# Patient Record
Sex: MALE | Race: WHITE | NOT HISPANIC OR LATINO | URBAN - METROPOLITAN AREA
[De-identification: names, ages, dates, MRNs, and addresses within clinical notes are randomized per-mention and may not be internally consistent; named-entity substitution may affect disease eponyms.]

---

## 2023-07-06 ENCOUNTER — EMERGENCY (EMERGENCY)
Facility: HOSPITAL | Age: 63
LOS: 1 days | Discharge: HOME | End: 2023-07-06
Attending: EMERGENCY MEDICINE | Admitting: EMERGENCY MEDICINE
Payer: COMMERCIAL

## 2023-07-06 VITALS
OXYGEN SATURATION: 99 % | RESPIRATION RATE: 15 BRPM | HEART RATE: 81 BPM | HEIGHT: 70 IN | TEMPERATURE: 98 F | WEIGHT: 190.04 LBS | SYSTOLIC BLOOD PRESSURE: 152 MMHG | DIASTOLIC BLOOD PRESSURE: 83 MMHG

## 2023-07-06 PROCEDURE — 73630 X-RAY EXAM OF FOOT: CPT | Mod: 26,RT

## 2023-07-06 PROCEDURE — 99284 EMERGENCY DEPT VISIT MOD MDM: CPT | Mod: 25

## 2023-07-06 PROCEDURE — 73630 X-RAY EXAM OF FOOT: CPT

## 2023-07-06 PROCEDURE — 29515 APPLICATION SHORT LEG SPLINT: CPT

## 2023-07-06 PROCEDURE — 99283 EMERGENCY DEPT VISIT LOW MDM: CPT

## 2023-07-06 RX ORDER — IBUPROFEN 200 MG
600 TABLET ORAL ONCE
Refills: 0 | Status: DISCONTINUED | OUTPATIENT
Start: 2023-07-06 | End: 2023-07-06

## 2023-07-06 RX ORDER — OXYCODONE AND ACETAMINOPHEN 5; 325 MG/1; MG/1
1 TABLET ORAL
Qty: 6 | Refills: 0
Start: 2023-07-06

## 2023-07-06 RX ORDER — ACETAMINOPHEN 500 MG
975 TABLET ORAL ONCE
Refills: 0 | Status: DISCONTINUED | OUTPATIENT
Start: 2023-07-06 | End: 2023-07-06

## 2023-07-06 NOTE — ED PROVIDER NOTE - PROGRESS NOTE DETAILS
Patient stable.  X-ray results discussed with patient, no evidence of acute fracture.  Discussed etiology of pain uncertain, no evidence of life-threatening condition at this time.  Patient not over joint, low suspicion for gouty arthritis.  Discussed may be related to early diabetic neuropathy versus tendinitis.  Recommend anti-inflammatory, ice, rest, Ace wrap, elevation.  Will refer to podiatry

## 2023-07-06 NOTE — ED PROVIDER NOTE - DIFFERENTIAL DIAGNOSIS
Differential Diagnosis Differentials include but not limited to sprain, strain, contusion, tendinitis, fracture, gouty arthritis

## 2023-07-06 NOTE — ED ADULT NURSE NOTE - OBJECTIVE STATEMENT
Pt is alert and oriented. Pt states that he was at work and suddenly developed severe right foot pain. Pt denies any injury to the foot. Pts right foot appears slightly red and slightly swollen. + pedal pulses in bilateral feet. Pt denies sob, chest pain, nausea, vomiting, and dizziness. Pt resp are even and unlabored, skin color eliecer for race. Pt updated on plan of care.

## 2023-07-06 NOTE — ED PROVIDER NOTE - CARE PROVIDER_API CALL
Jarek Gregory  Podiatric Medicine and Surgery  [address] [place] [address]  Phone: [phone]  Fax: [phone]  Follow Up Time: 1-3 Days

## 2023-07-06 NOTE — ED PROVIDER NOTE - PATIENT PORTAL LINK FT
You can access the FollowMyHealth Patient Portal offered by [facility] by registering at the following website: [email or web address]. By joining [facility]’s FollowMyHealth portal, you will also be able to view your health information using other applications (apps) compatible with our system.

## 2023-07-06 NOTE — ED PROVIDER NOTE - MUSCULOSKELETAL, MLM
mild soft tissue tenderness and slight swelling to dorsal medical aspect of right foot. no tenderness to 1st MTP joint or base of 5th metatarsal. full ROM

## 2023-07-06 NOTE — ED PROVIDER NOTE - NS ED ATTENDING STATEMENT MOD
This was a shared visit with the DARLING. I reviewed and verified the documentation and independently performed the documented:

## 2023-07-06 NOTE — ED ADULT NURSE NOTE - NSFALLUNIVINTERV_ED_ALL_ED
Bed/Stretcher in lowest position, wheels locked, appropriate side rails in place/Call bell, personal items and telephone in reach/Instruct patient to call for assistance before getting out of bed/chair/stretcher/Non-slip footwear applied when patient is off stretcher/[place] to call system/Physically safe environment - no spills, clutter or unnecessary equipment/Purposeful proactive rounding/Room/bathroom lighting operational, light cord in reach

## 2023-07-06 NOTE — ED PROCEDURE NOTE - NS ED ATTENDING STATEMENT MOD
[Breast Self Exam] : breast self exam [Nutrition] : nutrition [Exercise] : exercise This was a shared visit with the DARLING. I reviewed and verified the documentation and independently performed the documented:

## 2023-07-06 NOTE — ED PROVIDER NOTE - OBJECTIVE STATEMENT
62-year-old male with history of diabetes mellitus presents with pain to the top of his right foot that occurred today at work around 2 PM.  Denies any injury or trauma.  States he was sitting at the desk when the pain started.  Noticed some slight redness so took 2 aspirin and Benadryl.  Redness is overall improved.  Was not itchy in nature.  Denies any increase of activity.  Denies any change in footwear.  Denies any known bites or exposures.  Denies history of similar symptoms.  Denies any fevers.  Denies calf pain or swelling. reports right foot looks swollen compared to left   PCP Juan Pablo Gomez

## 2023-07-06 NOTE — ED PROVIDER NOTE - NSFOLLOWUPINSTRUCTIONS_ED_ALL_ED_FT
Rest, ice, compression, elevation, support  Naprosyn twice a day with food for pain and inflammation  Percocet next few days for severe pain  Follow-up with podiatry, referral provided      Foot Pain  Many things can cause foot pain. Some common causes are:  An injury.  A sprain.  Arthritis.  Blisters.  Bunions.  Follow these instructions at home:  Managing pain, stiffness, and swelling    Bag of ice on a towel on the skin.  If directed, put ice on the painful area:  Put ice in a plastic bag.  Place a towel between your skin and the bag.  Leave the ice on for 20 minutes, 2–3 times a day.  Activity    Do not stand or walk for long periods.  Return to your normal activities as told by your health care provider. Ask your health care provider what activities are safe for you.  Do stretches to relieve foot pain and stiffness as told by your health care provider.  Do not lift anything that is heavier than 10 lb (4.5 kg), or the limit that you are told, until your health care provider says that it is safe. Lifting a lot of weight can put added pressure on your feet.  Lifestyle    Wear comfortable, supportive shoes that fit you well. Do not wear high heels.  Keep your feet clean and dry.  General instructions    Take over-the-counter and prescription medicines only as told by your health care provider.  Rub your foot gently.  Pay attention to any changes in your symptoms.  Keep all follow-up visits as told by your health care provider. This is important.  Contact a health care provider if:  Your pain does not get better after a few days of self-care.  Your pain gets worse.  You cannot stand on your foot.  Get help right away if:  Your foot is numb or tingling.  Your foot or toes are swollen.  Your foot or toes turn white or blue.  You have warmth and redness along your foot.  Summary  Common causes of foot pain are injury, sprain, arthritis, blisters, or bunions.  Ice, medicines, and comfortable shoes may help foot pain.  Contact your health care provider if your pain does not get better after a few days of self-care.  This information is not intended to replace advice given to you by your health care provider. Make sure you discuss any questions you have with your health care provider.

## 2023-07-06 NOTE — ED ADULT TRIAGE NOTE - CHIEF COMPLAINT QUOTE
I have right foot pain since 2 pm today. patient was working in his office and no trauma to his foot. hx of DM  good pulse but swollen

## 2023-07-06 NOTE — ED PROVIDER NOTE - CLINICAL SUMMARY MEDICAL DECISION MAKING FREE TEXT BOX
62-year-old male with history of diabetes complaining of right foot pain which occurred this afternoon while he was sitting at a desk.  Denies injury or precipitating event.  Took aspirin and Benadryl thinking it might be a bug bite of some kind.  Denies fever redness swelling or other symptom.  Physical exam vital signs stable afebrile no distress.  Extremity exam right foot without visible bruise or deformity, no swelling.  No redness or warmth.  Point tenderness over fourth metatarsal.  Full range of motion pulses and sensation intact.  Remainder of exam is unremarkable.  Impression is right foot pain rule out fracture, plan is x-ray pain meds may need splint or wrap and Ortho follow-up.

## 2024-02-24 ENCOUNTER — TRANSCRIPTION ENCOUNTER (OUTPATIENT)
Age: 64
End: 2024-02-24

## 2024-02-25 ENCOUNTER — INPATIENT (INPATIENT)
Facility: HOSPITAL | Age: 64
LOS: 2 days | Discharge: HOME | DRG: 347 | End: 2024-02-28
Attending: SURGERY | Admitting: SURGERY
Payer: COMMERCIAL

## 2024-02-25 ENCOUNTER — RESULT REVIEW (OUTPATIENT)
Age: 64
End: 2024-02-25

## 2024-02-25 VITALS
HEART RATE: 77 BPM | OXYGEN SATURATION: 97 % | TEMPERATURE: 98 F | RESPIRATION RATE: 16 BRPM | SYSTOLIC BLOOD PRESSURE: 126 MMHG | DIASTOLIC BLOOD PRESSURE: 77 MMHG | HEIGHT: 69 IN | WEIGHT: 198.64 LBS

## 2024-02-25 DIAGNOSIS — R19.8 OTHER SPECIFIED SYMPTOMS AND SIGNS INVOLVING THE DIGESTIVE SYSTEM AND ABDOMEN: ICD-10-CM

## 2024-02-25 DIAGNOSIS — Z90.49 ACQUIRED ABSENCE OF OTHER SPECIFIED PARTS OF DIGESTIVE TRACT: Chronic | ICD-10-CM

## 2024-02-25 DIAGNOSIS — E11.65 TYPE 2 DIABETES MELLITUS WITH HYPERGLYCEMIA: ICD-10-CM

## 2024-02-25 PROBLEM — E11.9 TYPE 2 DIABETES MELLITUS WITHOUT COMPLICATIONS: Chronic | Status: ACTIVE | Noted: 2023-07-06

## 2024-02-25 LAB
A1C WITH ESTIMATED AVERAGE GLUCOSE RESULT: 7.2 % — HIGH (ref 4–5.6)
ALBUMIN SERPL ELPH-MCNC: 3.6 G/DL — SIGNIFICANT CHANGE UP (ref 3.3–5)
ALP SERPL-CCNC: 57 U/L — SIGNIFICANT CHANGE UP (ref 30–120)
ALT FLD-CCNC: 32 U/L — SIGNIFICANT CHANGE UP (ref 10–60)
ANION GAP SERPL CALC-SCNC: 6 MMOL/L — SIGNIFICANT CHANGE UP (ref 5–17)
APPEARANCE UR: CLEAR — SIGNIFICANT CHANGE UP
AST SERPL-CCNC: 21 U/L — SIGNIFICANT CHANGE UP (ref 10–40)
BASOPHILS # BLD AUTO: 0.04 K/UL — SIGNIFICANT CHANGE UP (ref 0–0.2)
BASOPHILS NFR BLD AUTO: 0.3 % — SIGNIFICANT CHANGE UP (ref 0–2)
BILIRUB SERPL-MCNC: 0.4 MG/DL — SIGNIFICANT CHANGE UP (ref 0.2–1.2)
BILIRUB UR-MCNC: NEGATIVE — SIGNIFICANT CHANGE UP
BLD GP AB SCN SERPL QL: SIGNIFICANT CHANGE UP
BUN SERPL-MCNC: 17 MG/DL — SIGNIFICANT CHANGE UP (ref 7–23)
CALCIUM SERPL-MCNC: 9.1 MG/DL — SIGNIFICANT CHANGE UP (ref 8.4–10.5)
CHLORIDE SERPL-SCNC: 104 MMOL/L — SIGNIFICANT CHANGE UP (ref 96–108)
CO2 SERPL-SCNC: 28 MMOL/L — SIGNIFICANT CHANGE UP (ref 22–31)
COLOR SPEC: YELLOW — SIGNIFICANT CHANGE UP
CREAT SERPL-MCNC: 1.04 MG/DL — SIGNIFICANT CHANGE UP (ref 0.5–1.3)
DIFF PNL FLD: NEGATIVE — SIGNIFICANT CHANGE UP
EGFR: 81 ML/MIN/1.73M2 — SIGNIFICANT CHANGE UP
EOSINOPHIL # BLD AUTO: 0.28 K/UL — SIGNIFICANT CHANGE UP (ref 0–0.5)
EOSINOPHIL NFR BLD AUTO: 2.2 % — SIGNIFICANT CHANGE UP (ref 0–6)
ESTIMATED AVERAGE GLUCOSE: 160 MG/DL — HIGH (ref 68–114)
GLUCOSE BLDC GLUCOMTR-MCNC: 116 MG/DL — HIGH (ref 70–99)
GLUCOSE BLDC GLUCOMTR-MCNC: 165 MG/DL — HIGH (ref 70–99)
GLUCOSE BLDC GLUCOMTR-MCNC: 222 MG/DL — HIGH (ref 70–99)
GLUCOSE BLDC GLUCOMTR-MCNC: 235 MG/DL — HIGH (ref 70–99)
GLUCOSE BLDC GLUCOMTR-MCNC: 277 MG/DL — HIGH (ref 70–99)
GLUCOSE SERPL-MCNC: 206 MG/DL — HIGH (ref 70–99)
GLUCOSE UR QL: 100 MG/DL
HCT VFR BLD CALC: 43.2 % — SIGNIFICANT CHANGE UP (ref 39–50)
HGB BLD-MCNC: 13.8 G/DL — SIGNIFICANT CHANGE UP (ref 13–17)
IMM GRANULOCYTES NFR BLD AUTO: 0.4 % — SIGNIFICANT CHANGE UP (ref 0–0.9)
KETONES UR-MCNC: NEGATIVE MG/DL — SIGNIFICANT CHANGE UP
LACTATE SERPL-SCNC: 2 MMOL/L — SIGNIFICANT CHANGE UP (ref 0.7–2)
LEUKOCYTE ESTERASE UR-ACNC: NEGATIVE — SIGNIFICANT CHANGE UP
LIDOCAIN IGE QN: 49 U/L — SIGNIFICANT CHANGE UP (ref 16–77)
LYMPHOCYTES # BLD AUTO: 1.38 K/UL — SIGNIFICANT CHANGE UP (ref 1–3.3)
LYMPHOCYTES # BLD AUTO: 11 % — LOW (ref 13–44)
MCHC RBC-ENTMCNC: 27.6 PG — SIGNIFICANT CHANGE UP (ref 27–34)
MCHC RBC-ENTMCNC: 31.9 GM/DL — LOW (ref 32–36)
MCV RBC AUTO: 86.4 FL — SIGNIFICANT CHANGE UP (ref 80–100)
MONOCYTES # BLD AUTO: 0.85 K/UL — SIGNIFICANT CHANGE UP (ref 0–0.9)
MONOCYTES NFR BLD AUTO: 6.8 % — SIGNIFICANT CHANGE UP (ref 2–14)
NEUTROPHILS # BLD AUTO: 9.95 K/UL — HIGH (ref 1.8–7.4)
NEUTROPHILS NFR BLD AUTO: 79.3 % — HIGH (ref 43–77)
NITRITE UR-MCNC: NEGATIVE — SIGNIFICANT CHANGE UP
NRBC # BLD: 0 /100 WBCS — SIGNIFICANT CHANGE UP (ref 0–0)
PH UR: 5.5 — SIGNIFICANT CHANGE UP (ref 5–8)
PLATELET # BLD AUTO: 244 K/UL — SIGNIFICANT CHANGE UP (ref 150–400)
POTASSIUM SERPL-MCNC: 5.1 MMOL/L — SIGNIFICANT CHANGE UP (ref 3.5–5.3)
POTASSIUM SERPL-SCNC: 5.1 MMOL/L — SIGNIFICANT CHANGE UP (ref 3.5–5.3)
PROT SERPL-MCNC: 7.2 G/DL — SIGNIFICANT CHANGE UP (ref 6–8.3)
PROT UR-MCNC: NEGATIVE MG/DL — SIGNIFICANT CHANGE UP
RBC # BLD: 5 M/UL — SIGNIFICANT CHANGE UP (ref 4.2–5.8)
RBC # FLD: 13.2 % — SIGNIFICANT CHANGE UP (ref 10.3–14.5)
SODIUM SERPL-SCNC: 138 MMOL/L — SIGNIFICANT CHANGE UP (ref 135–145)
SP GR SPEC: 1.07 — HIGH (ref 1–1.03)
UROBILINOGEN FLD QL: 0.2 MG/DL — SIGNIFICANT CHANGE UP (ref 0.2–1)
WBC # BLD: 12.55 K/UL — HIGH (ref 3.8–10.5)
WBC # FLD AUTO: 12.55 K/UL — HIGH (ref 3.8–10.5)

## 2024-02-25 PROCEDURE — 71045 X-RAY EXAM CHEST 1 VIEW: CPT | Mod: 26

## 2024-02-25 PROCEDURE — 99223 1ST HOSP IP/OBS HIGH 75: CPT | Mod: 57

## 2024-02-25 PROCEDURE — 88307 TISSUE EXAM BY PATHOLOGIST: CPT | Mod: 26

## 2024-02-25 PROCEDURE — 44202 LAP ENTERECTOMY: CPT

## 2024-02-25 PROCEDURE — 74177 CT ABD & PELVIS W/CONTRAST: CPT | Mod: 26,MC

## 2024-02-25 PROCEDURE — 99223 1ST HOSP IP/OBS HIGH 75: CPT

## 2024-02-25 PROCEDURE — 99285 EMERGENCY DEPT VISIT HI MDM: CPT

## 2024-02-25 DEVICE — STAPLER COVIDIEN TRI-STAPLE 45MM BLACK INTELLIGENT RELOAD: Type: IMPLANTABLE DEVICE | Site: ABDOMINAL | Status: FUNCTIONAL

## 2024-02-25 DEVICE — STAPLER COVIDIEN TRI-STAPLE 60MM PURPLE RELOAD: Type: IMPLANTABLE DEVICE | Site: ABDOMINAL | Status: FUNCTIONAL

## 2024-02-25 DEVICE — STAPLER COVIDIEN TRI-STAPLE 45MM TAN RELOAD: Type: IMPLANTABLE DEVICE | Site: ABDOMINAL | Status: FUNCTIONAL

## 2024-02-25 DEVICE — STAPLER COVIDIEN TRI-STAPLE 45MM PURPLE RELOAD: Type: IMPLANTABLE DEVICE | Site: ABDOMINAL | Status: FUNCTIONAL

## 2024-02-25 RX ORDER — DEXTROSE 50 % IN WATER 50 %
12.5 SYRINGE (ML) INTRAVENOUS ONCE
Refills: 0 | Status: DISCONTINUED | OUTPATIENT
Start: 2024-02-25 | End: 2024-02-28

## 2024-02-25 RX ORDER — SODIUM CHLORIDE 9 MG/ML
1000 INJECTION, SOLUTION INTRAVENOUS
Refills: 0 | Status: DISCONTINUED | OUTPATIENT
Start: 2024-02-25 | End: 2024-02-25

## 2024-02-25 RX ORDER — SIMVASTATIN 20 MG/1
1 TABLET, FILM COATED ORAL
Refills: 0 | DISCHARGE

## 2024-02-25 RX ORDER — GLUCAGON INJECTION, SOLUTION 0.5 MG/.1ML
1 INJECTION, SOLUTION SUBCUTANEOUS ONCE
Refills: 0 | Status: DISCONTINUED | OUTPATIENT
Start: 2024-02-25 | End: 2024-02-28

## 2024-02-25 RX ORDER — PIPERACILLIN AND TAZOBACTAM 4; .5 G/20ML; G/20ML
3.38 INJECTION, POWDER, LYOPHILIZED, FOR SOLUTION INTRAVENOUS ONCE
Refills: 0 | Status: DISCONTINUED | OUTPATIENT
Start: 2024-02-25 | End: 2024-02-25

## 2024-02-25 RX ORDER — MORPHINE SULFATE 50 MG/1
4 CAPSULE, EXTENDED RELEASE ORAL ONCE
Refills: 0 | Status: DISCONTINUED | OUTPATIENT
Start: 2024-02-25 | End: 2024-02-25

## 2024-02-25 RX ORDER — LISINOPRIL 2.5 MG/1
10 TABLET ORAL DAILY
Refills: 0 | Status: DISCONTINUED | OUTPATIENT
Start: 2024-02-25 | End: 2024-02-28

## 2024-02-25 RX ORDER — METFORMIN HYDROCHLORIDE 850 MG/1
1 TABLET ORAL
Refills: 0 | DISCHARGE

## 2024-02-25 RX ORDER — HYDROMORPHONE HYDROCHLORIDE 2 MG/ML
1 INJECTION INTRAMUSCULAR; INTRAVENOUS; SUBCUTANEOUS
Refills: 0 | Status: DISCONTINUED | OUTPATIENT
Start: 2024-02-25 | End: 2024-02-25

## 2024-02-25 RX ORDER — OMEPRAZOLE 10 MG/1
1 CAPSULE, DELAYED RELEASE ORAL
Refills: 0 | DISCHARGE

## 2024-02-25 RX ORDER — HYDROMORPHONE HYDROCHLORIDE 2 MG/ML
0.5 INJECTION INTRAMUSCULAR; INTRAVENOUS; SUBCUTANEOUS EVERY 4 HOURS
Refills: 0 | Status: DISCONTINUED | OUTPATIENT
Start: 2024-02-25 | End: 2024-02-28

## 2024-02-25 RX ORDER — LISINOPRIL 2.5 MG/1
1 TABLET ORAL
Refills: 0 | DISCHARGE

## 2024-02-25 RX ORDER — SODIUM CHLORIDE 9 MG/ML
1000 INJECTION INTRAMUSCULAR; INTRAVENOUS; SUBCUTANEOUS ONCE
Refills: 0 | Status: COMPLETED | OUTPATIENT
Start: 2024-02-25 | End: 2024-02-25

## 2024-02-25 RX ORDER — DEXTROSE 50 % IN WATER 50 %
25 SYRINGE (ML) INTRAVENOUS ONCE
Refills: 0 | Status: DISCONTINUED | OUTPATIENT
Start: 2024-02-25 | End: 2024-02-28

## 2024-02-25 RX ORDER — CEFOTETAN DISODIUM 1 G
2 VIAL (EA) INJECTION ONCE
Refills: 0 | Status: DISCONTINUED | OUTPATIENT
Start: 2024-02-25 | End: 2024-02-28

## 2024-02-25 RX ORDER — DEXTROSE 50 % IN WATER 50 %
15 SYRINGE (ML) INTRAVENOUS ONCE
Refills: 0 | Status: DISCONTINUED | OUTPATIENT
Start: 2024-02-25 | End: 2024-02-28

## 2024-02-25 RX ORDER — DULAGLUTIDE 4.5 MG/.5ML
1.5 INJECTION, SOLUTION SUBCUTANEOUS
Refills: 0 | DISCHARGE

## 2024-02-25 RX ORDER — ONDANSETRON 8 MG/1
4 TABLET, FILM COATED ORAL ONCE
Refills: 0 | Status: DISCONTINUED | OUTPATIENT
Start: 2024-02-25 | End: 2024-02-25

## 2024-02-25 RX ORDER — ONDANSETRON 8 MG/1
4 TABLET, FILM COATED ORAL ONCE
Refills: 0 | Status: DISCONTINUED | OUTPATIENT
Start: 2024-02-25 | End: 2024-02-28

## 2024-02-25 RX ORDER — SODIUM CHLORIDE 9 MG/ML
1000 INJECTION, SOLUTION INTRAVENOUS
Refills: 0 | Status: DISCONTINUED | OUTPATIENT
Start: 2024-02-25 | End: 2024-02-28

## 2024-02-25 RX ORDER — HYDROMORPHONE HYDROCHLORIDE 2 MG/ML
0.5 INJECTION INTRAMUSCULAR; INTRAVENOUS; SUBCUTANEOUS
Refills: 0 | Status: DISCONTINUED | OUTPATIENT
Start: 2024-02-25 | End: 2024-02-25

## 2024-02-25 RX ORDER — ACETAMINOPHEN 500 MG
1000 TABLET ORAL EVERY 6 HOURS
Refills: 0 | Status: COMPLETED | OUTPATIENT
Start: 2024-02-25 | End: 2024-02-26

## 2024-02-25 RX ORDER — PIPERACILLIN AND TAZOBACTAM 4; .5 G/20ML; G/20ML
3.38 INJECTION, POWDER, LYOPHILIZED, FOR SOLUTION INTRAVENOUS ONCE
Refills: 0 | Status: COMPLETED | OUTPATIENT
Start: 2024-02-25 | End: 2024-02-25

## 2024-02-25 RX ORDER — ATORVASTATIN CALCIUM 80 MG/1
40 TABLET, FILM COATED ORAL AT BEDTIME
Refills: 0 | Status: DISCONTINUED | OUTPATIENT
Start: 2024-02-25 | End: 2024-02-28

## 2024-02-25 RX ORDER — INSULIN LISPRO 100/ML
0 VIAL (ML) SUBCUTANEOUS
Refills: 0 | DISCHARGE

## 2024-02-25 RX ORDER — INSULIN LISPRO 100/ML
VIAL (ML) SUBCUTANEOUS EVERY 6 HOURS
Refills: 0 | Status: DISCONTINUED | OUTPATIENT
Start: 2024-02-25 | End: 2024-02-25

## 2024-02-25 RX ORDER — PIPERACILLIN AND TAZOBACTAM 4; .5 G/20ML; G/20ML
3.38 INJECTION, POWDER, LYOPHILIZED, FOR SOLUTION INTRAVENOUS EVERY 8 HOURS
Refills: 0 | Status: DISCONTINUED | OUTPATIENT
Start: 2024-02-25 | End: 2024-02-28

## 2024-02-25 RX ORDER — OXYCODONE HYDROCHLORIDE 5 MG/1
5 TABLET ORAL EVERY 6 HOURS
Refills: 0 | Status: DISCONTINUED | OUTPATIENT
Start: 2024-02-25 | End: 2024-02-28

## 2024-02-25 RX ORDER — ONDANSETRON 8 MG/1
4 TABLET, FILM COATED ORAL ONCE
Refills: 0 | Status: COMPLETED | OUTPATIENT
Start: 2024-02-25 | End: 2024-02-25

## 2024-02-25 RX ORDER — INSULIN LISPRO 100/ML
VIAL (ML) SUBCUTANEOUS EVERY 6 HOURS
Refills: 0 | Status: DISCONTINUED | OUTPATIENT
Start: 2024-02-25 | End: 2024-02-26

## 2024-02-25 RX ORDER — PANTOPRAZOLE SODIUM 20 MG/1
40 TABLET, DELAYED RELEASE ORAL DAILY
Refills: 0 | Status: DISCONTINUED | OUTPATIENT
Start: 2024-02-25 | End: 2024-02-28

## 2024-02-25 RX ADMIN — PIPERACILLIN AND TAZOBACTAM 25 GRAM(S): 4; .5 INJECTION, POWDER, LYOPHILIZED, FOR SOLUTION INTRAVENOUS at 17:05

## 2024-02-25 RX ADMIN — SODIUM CHLORIDE 1000 MILLILITER(S): 9 INJECTION INTRAMUSCULAR; INTRAVENOUS; SUBCUTANEOUS at 09:21

## 2024-02-25 RX ADMIN — Medication 1000 MILLIGRAM(S): at 21:20

## 2024-02-25 RX ADMIN — PIPERACILLIN AND TAZOBACTAM 200 GRAM(S): 4; .5 INJECTION, POWDER, LYOPHILIZED, FOR SOLUTION INTRAVENOUS at 09:21

## 2024-02-25 RX ADMIN — HYDROMORPHONE HYDROCHLORIDE 0.5 MILLIGRAM(S): 2 INJECTION INTRAMUSCULAR; INTRAVENOUS; SUBCUTANEOUS at 18:30

## 2024-02-25 RX ADMIN — HYDROMORPHONE HYDROCHLORIDE 0.5 MILLIGRAM(S): 2 INJECTION INTRAMUSCULAR; INTRAVENOUS; SUBCUTANEOUS at 18:15

## 2024-02-25 RX ADMIN — SODIUM CHLORIDE 100 MILLILITER(S): 9 INJECTION, SOLUTION INTRAVENOUS at 13:44

## 2024-02-25 RX ADMIN — ATORVASTATIN CALCIUM 40 MILLIGRAM(S): 80 TABLET, FILM COATED ORAL at 22:04

## 2024-02-25 RX ADMIN — SODIUM CHLORIDE 100 MILLILITER(S): 9 INJECTION, SOLUTION INTRAVENOUS at 17:13

## 2024-02-25 RX ADMIN — MORPHINE SULFATE 4 MILLIGRAM(S): 50 CAPSULE, EXTENDED RELEASE ORAL at 06:44

## 2024-02-25 RX ADMIN — SODIUM CHLORIDE 1000 MILLILITER(S): 9 INJECTION INTRAMUSCULAR; INTRAVENOUS; SUBCUTANEOUS at 11:30

## 2024-02-25 RX ADMIN — HYDROMORPHONE HYDROCHLORIDE 0.5 MILLIGRAM(S): 2 INJECTION INTRAMUSCULAR; INTRAVENOUS; SUBCUTANEOUS at 23:52

## 2024-02-25 RX ADMIN — SODIUM CHLORIDE 1000 MILLILITER(S): 9 INJECTION INTRAMUSCULAR; INTRAVENOUS; SUBCUTANEOUS at 07:44

## 2024-02-25 RX ADMIN — MORPHINE SULFATE 4 MILLIGRAM(S): 50 CAPSULE, EXTENDED RELEASE ORAL at 06:41

## 2024-02-25 RX ADMIN — SODIUM CHLORIDE 150 MILLILITER(S): 9 INJECTION, SOLUTION INTRAVENOUS at 21:17

## 2024-02-25 RX ADMIN — Medication 4: at 18:39

## 2024-02-25 RX ADMIN — MORPHINE SULFATE 4 MILLIGRAM(S): 50 CAPSULE, EXTENDED RELEASE ORAL at 07:21

## 2024-02-25 RX ADMIN — ONDANSETRON 4 MILLIGRAM(S): 8 TABLET, FILM COATED ORAL at 06:41

## 2024-02-25 RX ADMIN — MORPHINE SULFATE 4 MILLIGRAM(S): 50 CAPSULE, EXTENDED RELEASE ORAL at 08:00

## 2024-02-25 RX ADMIN — Medication 400 MILLIGRAM(S): at 21:17

## 2024-02-25 RX ADMIN — PIPERACILLIN AND TAZOBACTAM 3.38 GRAM(S): 4; .5 INJECTION, POWDER, LYOPHILIZED, FOR SOLUTION INTRAVENOUS at 10:00

## 2024-02-25 RX ADMIN — SODIUM CHLORIDE 1000 MILLILITER(S): 9 INJECTION INTRAMUSCULAR; INTRAVENOUS; SUBCUTANEOUS at 06:45

## 2024-02-25 NOTE — ED ADULT NURSE NOTE - FINAL NURSING ELECTRONIC SIGNATURE
Patient understands there is an increased risk of corneal edema after cataract surgery. 25-Feb-2024 11:39

## 2024-02-25 NOTE — ED ADULT NURSE NOTE - OBJECTIVE STATEMENT
patient presents to emergency department with approximately 2-1/2 hours of right lower quadrant abdominal pain.  Patient states the pain is constant.  States that at the onset it was radiating up his abdomen to his shoulder.  But now is localized to the right lower quadrant.  Has had nausea but no vomiting.  No diarrhea or constipation.  No urinary complaints.  Patient has a history of colon cancer with a resection of the "lower part of his colon".

## 2024-02-25 NOTE — H&P ADULT - HISTORY OF PRESENT ILLNESS
63y M presenting with acute onset right sided abdominal pain with nausea after eating pizza last night.  Symptoms began approximately 4am.  Pt thought he could be having appendicitis.  His however s/p Right hemicolectomy for colon Ca (performed by Dr Dayo Soto) in August 2023 and hence has no appendix.

## 2024-02-25 NOTE — BRIEF OPERATIVE NOTE - FIRST ASSIST
PA/NP...
Quality 226: Preventive Care And Screening: Tobacco Use: Screening And Cessation Intervention: Patient screened for tobacco use and is an ex/non-smoker
Quality 110: Preventive Care And Screening: Influenza Immunization: Influenza immunization was not ordered or administered, reason not given
Quality 431: Preventive Care And Screening: Unhealthy Alcohol Use - Screening: Patient screened for unhealthy alcohol use using a single question and scores less than 2 times per year
Detail Level: Detailed

## 2024-02-25 NOTE — H&P ADULT - ASSESSMENT
63y M, HTN, DM presenting with acute onset of Right sided abdominal pain.    Hx of Right hemicolectomy for Ca in August 2023 by Dr Soto  Leukocytosis 12  Abnormal CT scan, Images personally reviewed and discussed with patient and his wife.  Findings suggestive of free intraperitoneal air around liver.  Fecalization of SB loops right side of abdomen.  Possible SBO however no clear transition point.    Given radiographic findings and clinical exam, will take patient to OR for diagnostic laparoscopy, possible exploratory laparotomy, possible bowel resection, possible colostomy.  Risk, Benefits, and Alternatives to surgery have been discussed.  This includes but is not limited to bleeding, infection, damage to adjacent structures, need for additional surgery or interventions, adverse effects of anesthesia such as cardio-respiratory complications, prolonged intubation, cardiac arrhythmia, arrest, and or death.  Risks of forgoing surgery have also been discussed including progression of, and/or worsening of current condition which may then require urgent or emergent treatment or surgery.    Case discussed with Dr Soto's covering surgeon Dr Harpreet Handley.  They are not currently on staff here at [facility].  Medical team consulted as well for assistance in management of comorbidities, HTN and DM.  Disposition pending operative findings.

## 2024-02-25 NOTE — CONSULT NOTE ADULT - SUBJECTIVE AND OBJECTIVE BOX
Patient is a 63y old  Male who presents with a chief complaint of Abdominal pain.  Free intraperitoneal air (25 Feb 2024 10:52)      Reason For Consult: dm2 uncontrolled    HPI:  63y M presenting with acute onset right sided abdominal pain with nausea after eating pizza last night.  Symptoms began approximately 4am.  Pt thought he could be having appendicitis.  His however s/p Right hemicolectomy for colon Ca (performed by Dr Dayo Soto) in August 2023 and hence has no appendix.   (25 Feb 2024 10:52)      PAST MEDICAL & SURGICAL HISTORY:  Diabetes      Colon cancer      Hypertension      History of colon resection          FAMILY HISTORY:        Social History:    MEDICATIONS  (STANDING):  acetaminophen   IVPB .. 1000 milliGRAM(s) IV Intermittent every 6 hours  atorvastatin 40 milliGRAM(s) Oral at bedtime  cefoTEtan  IVPB 2 Gram(s) IV Intermittent once  dextrose 5%. 1000 milliLiter(s) (100 mL/Hr) IV Continuous <Continuous>  dextrose 5%. 1000 milliLiter(s) (50 mL/Hr) IV Continuous <Continuous>  dextrose 50% Injectable 25 Gram(s) IV Push once  dextrose 50% Injectable 25 Gram(s) IV Push once  dextrose 50% Injectable 12.5 Gram(s) IV Push once  glucagon  Injectable 1 milliGRAM(s) IntraMuscular once  insulin lispro (ADMELOG) corrective regimen sliding scale   SubCutaneous every 6 hours  lactated ringers. 1000 milliLiter(s) (100 mL/Hr) IV Continuous <Continuous>  lactated ringers. 1000 milliLiter(s) (150 mL/Hr) IV Continuous <Continuous>  lisinopril 10 milliGRAM(s) Oral daily  ondansetron Injectable 4 milliGRAM(s) IV Push once  pantoprazole  Injectable 40 milliGRAM(s) IV Push daily  piperacillin/tazobactam IVPB.. 3.375 Gram(s) IV Intermittent every 8 hours    MEDICATIONS  (PRN):  dextrose Oral Gel 15 Gram(s) Oral once PRN Blood Glucose LESS THAN 70 milliGRAM(s)/deciliter  HYDROmorphone  Injectable 0.5 milliGRAM(s) IV Push every 10 minutes PRN Moderate Pain (4 - 6)  HYDROmorphone  Injectable 1 milliGRAM(s) IV Push every 10 minutes PRN Severe Pain (7 - 10)  HYDROmorphone  Injectable 0.5 milliGRAM(s) IV Push every 4 hours PRN Severe Pain (7 - 10)  ondansetron Injectable 4 milliGRAM(s) IV Push once PRN Nausea and/or Vomiting  oxyCODONE    IR 5 milliGRAM(s) Oral every 6 hours PRN Moderate Pain (4 - 6)        T(C): 37.2 (02-25-24 @ 14:40), Max: 37.3 (02-25-24 @ 11:24)  HR: 82 (02-25-24 @ 14:55) (77 - 100)  BP: 130/68 (02-25-24 @ 14:55) (113/65 - 154/80)  RR: 14 (02-25-24 @ 14:55) (13 - 18)  SpO2: 94% (02-25-24 @ 14:55) (94% - 99%)  Wt(kg): --    PHYSICAL EXAM:  CHEST/LUNG: Clear to percussion bilaterally; No rales, rhonchi, wheezing, or rubs  HEART: Regular rate and rhythm; No murmurs, rubs, or gallops  ABDOMEN: Soft, Nontender, Nondistended; Bowel sounds present  EXTREMITIES:  2+ Peripheral Pulses, No clubbing, cyanosis, or edema  SKIN: No rashes or lesions    CAPILLARY BLOOD GLUCOSE      POCT Blood Glucose.: 235 mg/dL (25 Feb 2024 13:27)  POCT Blood Glucose.: 277 mg/dL (25 Feb 2024 11:22)  POCT Blood Glucose.: 165 mg/dL (25 Feb 2024 08:53)                            13.8   12.55 )-----------( 244      ( 25 Feb 2024 06:40 )             43.2       CMP:  02-25 @ 06:40  SGPT 32  Albumin 3.6   Alk Phos 57   Anion Gap 6   SGOT 21   Total Bili 0.4   BUN 17   Calcium Total 9.1   CO2 28   Chloride 104   Creatinine 1.04   eGFR if AA --   eGFR if non AA --   Glucose 206   Potassium 5.1   Protein 7.2   Sodium 138      Thyroid Function Tests:      Diabetes Tests:       Radiology:               
    Patient is a 63y old  Male who presents with a chief complaint of     HPI:  62 yo m pmh hld, htn, dm2 with insulin pump presents to emergency department with approximately 2-1/2 hours of right lower quadrant abdominal pain.  Patient states the pain is constant.  States that at the onset it was radiating up his abdomen to his shoulder.  But now is localized to the right lower quadrant.  Has had nausea but no vomiting.  No diarrhea or constipation.  No urinary complaints.  Patient has a history of colon cancer with a resection of the "lower part of his colon"                          13.8   12.55 )-----------( 244      ( 25 Feb 2024 06:40 )             43.2     02-25    138  |  104  |  17  ----------------------------<  206<H>  5.1   |  28  |  1.04    Ca    9.1      25 Feb 2024 06:40    TPro  7.2  /  Alb  3.6  /  TBili  0.4  /  DBili  x   /  AST  21  /  ALT  32  /  AlkPhos  57  02-25    CT abd/pelv  1. Mildly dilated distal small bowel in the right upper quadrant with   areas of fecalization. No transition point is identified. Free air is   noted around the liver, the etiology of which is not definitively   identified. Recent perforated distal small bowel obstruction is possible,   although the point of obstruction is not identified. Status post partial   right colectomy with anastomosis at the proximal transverse colon.   Surgical consultation is recommended.  2. Questioned wall thickening of a short segment of mid small bowel,   possibly reactive edema. Enteritis cannot be excluded.  3. Normal collapse versus wall thickening of a short segment of the mid   sigmoid. Short segment colitis cannot be excluded.    Dr. Gaviria was consulted      In regards to insulin pump.   Spoke with Dr. Craig Perlman endocrine.  He said it was okay to turn off insulin pump and recommended to have LDISS while pump is off    REVIEW OF SYSTEMS:  CONSTITUTIONAL: No fever, weight loss, or fatigue  RESPIRATORY: No cough, wheezing, chills or hemoptysis; No shortness of breath  CARDIOVASCULAR: No chest pain, palpitations, dizziness, or leg swelling  GASTROINTESTINAL: +abdominal pain RLQ.  GENITOURINARY: No dysuria, frequency, hematuria, or incontinence  NEUROLOGICAL: No headaches, memory loss, loss of strength, numbness, or tremors  MUSCULOSKELETAL: No muscle or back pain  PSYCHIATRIC: No depression, anxiety, mood swings, or difficulty sleeping      PAST MEDICAL & SURGICAL HISTORY:  Diabetes      Colon cancer      History of colon resection          SOCIAL HISTORY:  Deniest Tobacco  Denies Etoh  Denies Drugs      Allergies    No Known Allergies    Intolerances        MEDICATIONS  (STANDING):  dextrose 5%. 1000 milliLiter(s) (50 mL/Hr) IV Continuous <Continuous>  dextrose 5%. 1000 milliLiter(s) (100 mL/Hr) IV Continuous <Continuous>  dextrose 50% Injectable 25 Gram(s) IV Push once  dextrose 50% Injectable 12.5 Gram(s) IV Push once  dextrose 50% Injectable 25 Gram(s) IV Push once  glucagon  Injectable 1 milliGRAM(s) IntraMuscular once  insulin lispro (ADMELOG) corrective regimen sliding scale   SubCutaneous every 6 hours    MEDICATIONS  (PRN):  dextrose Oral Gel 15 Gram(s) Oral once PRN Blood Glucose LESS THAN 70 milliGRAM(s)/deciliter      FAMILY HISTORY:      Vital Signs Last 24 Hrs  T(C): 36.8 (25 Feb 2024 06:20), Max: 36.8 (25 Feb 2024 06:20)  T(F): 98.2 (25 Feb 2024 06:20), Max: 98.2 (25 Feb 2024 06:20)  HR: 77 (25 Feb 2024 06:20) (77 - 77)  BP: 126/77 (25 Feb 2024 06:20) (126/77 - 126/77)  BP(mean): --  RR: 16 (25 Feb 2024 06:20) (16 - 16)  SpO2: 97% (25 Feb 2024 06:20) (97% - 97%)    Parameters below as of 25 Feb 2024 06:20  Patient On (Oxygen Delivery Method): room air        PHYSICAL EXAM:    GENERAL: in discomfort  HEAD:  Atraumatic, Normocephalic  EYES: EOMI, PERRLA, conjunctiva and sclera clear  ENMT: No tonsillar erythema, exudates, or enlargement; Moist mucous membranes, Good dentition, No lesions  NECK: Supple, No JVD, Normal thyroid  NERVOUS SYSTEM:  Alert & Oriented X3, Good concentration; Moving all 4 extremities; No gross sensory deficits  CHEST/LUNG: Clear to auscultation bilaterally; No rales, rhonchi, wheezing, or rubs  HEART: Regular rate and rhythm; No murmurs, rubs, or gallops  ABDOMEN: RLQ pain  EXTREMITIES:  2+ Peripheral Pulses, No clubbing, cyanosis, or edema    Labs: Pending

## 2024-02-25 NOTE — ED PROVIDER NOTE - CLINICAL SUMMARY MEDICAL DECISION MAKING FREE TEXT BOX
Patient with right lower quadrant pain and tenderness.  Possibilities include but would not be limited to appendicitis, right-sided diverticulitis, kidney stone.  Bowel obstruction cannot be ruled out the less likely.  Will get labs and CAT scan.  Will treat pain and nausea.  Further disposition and treatment to be based on findings.

## 2024-02-25 NOTE — CONSULT NOTE ADULT - ASSESSMENT
62 yo m pmh hld, htn, dm2 with insulin pump     #Perforated bowl  Surgery consulted, will likely go to OR today  likely an urgent case  no medical contraindication for urgent procedure  optimized      #DM2  hold insulin pump  LDISS started  a1c  Spoke with Dr. Craig Perlman endocrine.  He said it was okay to turn off insulin pump and recommended to have LDISS while pump is off    #HTN  BP currently stable, hold PO meds  if BP unstable, can give IV lopressor or IV hydralazine    #HLD  hold statins, can resume once allowed to have PO meds    #DVT proph  hold chemical AC ,will defer to surgery team once patient has procedure  SCDs

## 2024-02-25 NOTE — BRIEF OPERATIVE NOTE - OPERATION/FINDINGS
consistent with above. Intra abdominal murky fluid and exudate noted. Suspected micro perforation from blind end of anastomosis.  Closure of mesenteric defect.

## 2024-02-25 NOTE — CONSULT NOTE ADULT - PROBLEM SELECTOR RECOMMENDATION 9
change mod dose admelog corrective scale coverage q6hrs while npo  insulin pump on hold during hospitalization  outpatient anti-diabetes regimen on hold  goal bg 100-180 in hosp setting

## 2024-02-25 NOTE — H&P ADULT - NSHPLABSRESULTS_GEN_ALL_CORE
13.8   12.55 )-----------( 244      ( 25 Feb 2024 06:40 )             43.2     02-25    138  |  104  |  17  ----------------------------<  206<H>  5.1   |  28  |  1.04    Ca    9.1      25 Feb 2024 06:40    TPro  7.2  /  Alb  3.6  /  TBili  0.4  /  DBili  x   /  AST  21  /  ALT  32  /  AlkPhos  57  02-25      ACC: [id number] EXAM:  CT ABDOMEN AND PELVIS IC   ORDERED BY: OLIVIER BURCIAGA     PROCEDURE DATE:  02/25/2024          INTERPRETATION:  CLINICAL INFORMATION: Right lower quadrant pain and   tenderness. History of colonic resection performed last year.    COMPARISON: None.    CONTRAST/COMPLICATIONS:  IV Contrast: Omnipaque 350  95 cc administered   5 cc discarded  Oral Contrast: NONE  Complications: None reported at time of study completion    PROCEDURE:  CT of the Abdomen and Pelvis was performed.  Sagittal and coronal reformats were performed.    FINDINGS:  LOWER CHEST: Dependent atelectasis.    LIVER: Within normal limits.  BILE DUCTS: Normal caliber.  GALLBLADDER: Within normal limits.  SPLEEN: Within normal limits.  PANCREAS: Within normal limits.  ADRENALS: Within normal limits.  KIDNEYS/URETERS: Within normal limits.    BLADDER: Within normal limits.  REPRODUCTIVE ORGANS: Prostate within normal limits.    BOWEL: Status post partial right colectomy with anastomosis at the   proximal transverse colon. Mildly dilated small bowel loops in the right   upper quadrant without a transition point. Fecalization of a segment of   small bowel in the right upper quadrant suggestive of slow transit of   intraluminal contents. Questioned wall thickening of a short segment of   mid small bowel. Normal collapse versus wall thickening of a short   segment of the mid sigmoid. Evaluation of the bowel is limited by lack of   oral contrast. Appendix is surgically absent. Colonic diverticulosis.  PERITONEUM: No ascites. Small pockets of extraluminal gas are noted in   the perihepatic region.  VESSELS: Atherosclerotic changes.  RETROPERITONEUM/LYMPH NODES: No lymphadenopathy.  ABDOMINAL WALL: Mild subcutaneous fat stranding and small pockets of gas   in the lower abdominal wall, most likely due to recent injections.  BONES: Degenerative changes.    IMPRESSION:  1. Mildly dilated distal small bowel in the right upper quadrant with   areas of fecalization. No transition point is identified.Free air is   noted around the liver, the etiology of which is not definitively   identified. Recent perforated distal small bowel obstruction is possible,   although the point of obstruction is not identified. Status post partial   right colectomy with anastomosis at the proximal transverse colon.   Surgical consultation is recommended.  2. Questioned wall thickening of a short segment of mid small bowel,   possibly reactive edema. Enteritis cannot be excluded.  3. Normal collapse versus wall thickening of a short segment of the mid   sigmoid. Short segment colitis cannot be excluded.    Findings were discussed with Dr. Sebastian on 2/25/2024 8:18 AM by Dr. Soto with read back confirmation.    --- End of Report ---            KENYATTA SOTO MD; Attending Radiologist  This document has been electronically signed. Feb 25 2024  8:29AM

## 2024-02-25 NOTE — BRIEF OPERATIVE NOTE - NSICDXBRIEFPROCEDURE_GEN_ALL_CORE_FT
PROCEDURES:  Diagnostic laparoscopy 25-Feb-2024 13:49:42 with small bowel resection Mackenzie Frye

## 2024-02-25 NOTE — ED PROVIDER NOTE - OBJECTIVE STATEMENT
I have leading down year with a patient presents to emergency department with approximately 2-1/2 hours of right lower quadrant abdominal pain.  Patient states the pain is constant.  States that at the onset it was radiating up his abdomen to his shoulder.  But now is localized to the right lower quadrant.  Has had nausea but no vomiting.  No diarrhea or constipation.  No urinary complaints.  Patient has a history of colon cancer with a resection of the "lower part of his colon".

## 2024-02-25 NOTE — ED ADULT NURSE REASSESSMENT NOTE - NS ED NURSE REASSESS COMMENT FT1
Pt seen and assessed. Pt medicated for pain prior to CT scan. Pt reported pain subsided to 5/10 from 8/10 after Morphine 4 mg Iv given . Awaiting for CT scan result.

## 2024-02-25 NOTE — H&P ADULT - ALLERGIC/IMMUNOLOGIC
General: NAD, well appearing  HEENT: Normocephalic, atraumatic  Neck: No apparent stiffness or JVD  Pulm: Chest wall symmetric and nontender, lungs clear to ascultation   Cardiac: Regular rate and regular rhythm  Abdomen: Nontender and nondistended  Skin: Skin is warm, dry and intact without rashes or lesions.  Neuro: No motor or sensory deficits   MSK: No deformity or tenderness in ribs or spine
negative

## 2024-02-26 LAB
ANION GAP SERPL CALC-SCNC: 7 MMOL/L — SIGNIFICANT CHANGE UP (ref 5–17)
BUN SERPL-MCNC: 14 MG/DL — SIGNIFICANT CHANGE UP (ref 7–23)
CALCIUM SERPL-MCNC: 8.3 MG/DL — LOW (ref 8.4–10.5)
CHLORIDE SERPL-SCNC: 102 MMOL/L — SIGNIFICANT CHANGE UP (ref 96–108)
CO2 SERPL-SCNC: 28 MMOL/L — SIGNIFICANT CHANGE UP (ref 22–31)
CREAT SERPL-MCNC: 1.17 MG/DL — SIGNIFICANT CHANGE UP (ref 0.5–1.3)
EGFR: 70 ML/MIN/1.73M2 — SIGNIFICANT CHANGE UP
GLUCOSE BLDC GLUCOMTR-MCNC: 138 MG/DL — HIGH (ref 70–99)
GLUCOSE BLDC GLUCOMTR-MCNC: 157 MG/DL — HIGH (ref 70–99)
GLUCOSE BLDC GLUCOMTR-MCNC: 162 MG/DL — HIGH (ref 70–99)
GLUCOSE BLDC GLUCOMTR-MCNC: 167 MG/DL — HIGH (ref 70–99)
GLUCOSE SERPL-MCNC: 179 MG/DL — HIGH (ref 70–99)
HCT VFR BLD CALC: 37.8 % — LOW (ref 39–50)
HCV AB S/CO SERPL IA: 0.07 S/CO — SIGNIFICANT CHANGE UP (ref 0–0.99)
HCV AB SERPL-IMP: SIGNIFICANT CHANGE UP
HGB BLD-MCNC: 11.8 G/DL — LOW (ref 13–17)
MCHC RBC-ENTMCNC: 27.3 PG — SIGNIFICANT CHANGE UP (ref 27–34)
MCHC RBC-ENTMCNC: 31.2 GM/DL — LOW (ref 32–36)
MCV RBC AUTO: 87.3 FL — SIGNIFICANT CHANGE UP (ref 80–100)
NRBC # BLD: 0 /100 WBCS — SIGNIFICANT CHANGE UP (ref 0–0)
PLATELET # BLD AUTO: 189 K/UL — SIGNIFICANT CHANGE UP (ref 150–400)
POTASSIUM SERPL-MCNC: 4.2 MMOL/L — SIGNIFICANT CHANGE UP (ref 3.5–5.3)
POTASSIUM SERPL-SCNC: 4.2 MMOL/L — SIGNIFICANT CHANGE UP (ref 3.5–5.3)
RBC # BLD: 4.33 M/UL — SIGNIFICANT CHANGE UP (ref 4.2–5.8)
RBC # FLD: 13.3 % — SIGNIFICANT CHANGE UP (ref 10.3–14.5)
SODIUM SERPL-SCNC: 137 MMOL/L — SIGNIFICANT CHANGE UP (ref 135–145)
WBC # BLD: 11.5 K/UL — HIGH (ref 3.8–10.5)
WBC # FLD AUTO: 11.5 K/UL — HIGH (ref 3.8–10.5)

## 2024-02-26 PROCEDURE — 99024 POSTOP FOLLOW-UP VISIT: CPT

## 2024-02-26 PROCEDURE — 99232 SBSQ HOSP IP/OBS MODERATE 35: CPT

## 2024-02-26 RX ORDER — ENOXAPARIN SODIUM 100 MG/ML
40 INJECTION SUBCUTANEOUS EVERY 24 HOURS
Refills: 0 | Status: DISCONTINUED | OUTPATIENT
Start: 2024-02-26 | End: 2024-02-28

## 2024-02-26 RX ORDER — INFLUENZA VIRUS VACCINE 15; 15; 15; 15 UG/.5ML; UG/.5ML; UG/.5ML; UG/.5ML
0.5 SUSPENSION INTRAMUSCULAR ONCE
Refills: 0 | Status: DISCONTINUED | OUTPATIENT
Start: 2024-02-26 | End: 2024-02-28

## 2024-02-26 RX ORDER — INSULIN LISPRO 100/ML
VIAL (ML) SUBCUTANEOUS AT BEDTIME
Refills: 0 | Status: DISCONTINUED | OUTPATIENT
Start: 2024-02-26 | End: 2024-02-28

## 2024-02-26 RX ORDER — ONDANSETRON 8 MG/1
4 TABLET, FILM COATED ORAL ONCE
Refills: 0 | Status: DISCONTINUED | OUTPATIENT
Start: 2024-02-26 | End: 2024-02-28

## 2024-02-26 RX ORDER — ACETAMINOPHEN 500 MG
975 TABLET ORAL EVERY 6 HOURS
Refills: 0 | Status: DISCONTINUED | OUTPATIENT
Start: 2024-02-26 | End: 2024-02-28

## 2024-02-26 RX ORDER — INSULIN LISPRO 100/ML
VIAL (ML) SUBCUTANEOUS
Refills: 0 | Status: DISCONTINUED | OUTPATIENT
Start: 2024-02-26 | End: 2024-02-28

## 2024-02-26 RX ADMIN — SODIUM CHLORIDE 150 MILLILITER(S): 9 INJECTION, SOLUTION INTRAVENOUS at 15:18

## 2024-02-26 RX ADMIN — HYDROMORPHONE HYDROCHLORIDE 0.5 MILLIGRAM(S): 2 INJECTION INTRAMUSCULAR; INTRAVENOUS; SUBCUTANEOUS at 00:05

## 2024-02-26 RX ADMIN — Medication 975 MILLIGRAM(S): at 17:41

## 2024-02-26 RX ADMIN — Medication 400 MILLIGRAM(S): at 05:28

## 2024-02-26 RX ADMIN — Medication 400 MILLIGRAM(S): at 10:09

## 2024-02-26 RX ADMIN — Medication 2: at 17:40

## 2024-02-26 RX ADMIN — HYDROMORPHONE HYDROCHLORIDE 0.5 MILLIGRAM(S): 2 INJECTION INTRAMUSCULAR; INTRAVENOUS; SUBCUTANEOUS at 15:18

## 2024-02-26 RX ADMIN — OXYCODONE HYDROCHLORIDE 5 MILLIGRAM(S): 5 TABLET ORAL at 19:57

## 2024-02-26 RX ADMIN — PIPERACILLIN AND TAZOBACTAM 25 GRAM(S): 4; .5 INJECTION, POWDER, LYOPHILIZED, FOR SOLUTION INTRAVENOUS at 08:50

## 2024-02-26 RX ADMIN — PIPERACILLIN AND TAZOBACTAM 25 GRAM(S): 4; .5 INJECTION, POWDER, LYOPHILIZED, FOR SOLUTION INTRAVENOUS at 01:40

## 2024-02-26 RX ADMIN — ENOXAPARIN SODIUM 40 MILLIGRAM(S): 100 INJECTION SUBCUTANEOUS at 15:18

## 2024-02-26 RX ADMIN — Medication 1000 MILLIGRAM(S): at 05:34

## 2024-02-26 RX ADMIN — Medication 1000 MILLIGRAM(S): at 10:39

## 2024-02-26 RX ADMIN — Medication 2: at 12:22

## 2024-02-26 RX ADMIN — HYDROMORPHONE HYDROCHLORIDE 0.5 MILLIGRAM(S): 2 INJECTION INTRAMUSCULAR; INTRAVENOUS; SUBCUTANEOUS at 10:08

## 2024-02-26 RX ADMIN — Medication 2: at 06:12

## 2024-02-26 RX ADMIN — HYDROMORPHONE HYDROCHLORIDE 0.5 MILLIGRAM(S): 2 INJECTION INTRAMUSCULAR; INTRAVENOUS; SUBCUTANEOUS at 15:48

## 2024-02-26 RX ADMIN — PANTOPRAZOLE SODIUM 40 MILLIGRAM(S): 20 TABLET, DELAYED RELEASE ORAL at 13:39

## 2024-02-26 RX ADMIN — PIPERACILLIN AND TAZOBACTAM 25 GRAM(S): 4; .5 INJECTION, POWDER, LYOPHILIZED, FOR SOLUTION INTRAVENOUS at 17:43

## 2024-02-26 RX ADMIN — SODIUM CHLORIDE 150 MILLILITER(S): 9 INJECTION, SOLUTION INTRAVENOUS at 08:50

## 2024-02-26 RX ADMIN — LISINOPRIL 10 MILLIGRAM(S): 2.5 TABLET ORAL at 05:31

## 2024-02-26 RX ADMIN — SODIUM CHLORIDE 150 MILLILITER(S): 9 INJECTION, SOLUTION INTRAVENOUS at 05:28

## 2024-02-26 RX ADMIN — OXYCODONE HYDROCHLORIDE 5 MILLIGRAM(S): 5 TABLET ORAL at 20:30

## 2024-02-26 RX ADMIN — ATORVASTATIN CALCIUM 40 MILLIGRAM(S): 80 TABLET, FILM COATED ORAL at 21:12

## 2024-02-26 RX ADMIN — SODIUM CHLORIDE 150 MILLILITER(S): 9 INJECTION, SOLUTION INTRAVENOUS at 19:57

## 2024-02-26 RX ADMIN — HYDROMORPHONE HYDROCHLORIDE 0.5 MILLIGRAM(S): 2 INJECTION INTRAMUSCULAR; INTRAVENOUS; SUBCUTANEOUS at 10:38

## 2024-02-26 NOTE — PROGRESS NOTE ADULT - SUBJECTIVE AND OBJECTIVE BOX
SURGERY PA NOTE ON BEHALF OF DR. Gaviria / General SURGERY:    S: Patient seen and examined at bedside.     Pain is well controlled with medications.   Tolerating sips of water without n/v.   Denies any bowel function yet.   Denies fever, chills, n/v/d, chest pain, palpitations, SOB.      MEDICATIONS:  acetaminophen   IVPB .. 1000 milliGRAM(s) IV Intermittent every 6 hours  atorvastatin 40 milliGRAM(s) Oral at bedtime  cefoTEtan  IVPB 2 Gram(s) IV Intermittent once  dextrose 5%. 1000 milliLiter(s) IV Continuous <Continuous>  dextrose 5%. 1000 milliLiter(s) IV Continuous <Continuous>  dextrose 50% Injectable 25 Gram(s) IV Push once  dextrose 50% Injectable 25 Gram(s) IV Push once  dextrose 50% Injectable 12.5 Gram(s) IV Push once  dextrose Oral Gel 15 Gram(s) Oral once PRN  glucagon  Injectable 1 milliGRAM(s) IntraMuscular once  HYDROmorphone  Injectable 0.5 milliGRAM(s) IV Push every 4 hours PRN  insulin lispro (ADMELOG) corrective regimen sliding scale   SubCutaneous every 6 hours  lactated ringers. 1000 milliLiter(s) IV Continuous <Continuous>  lisinopril 10 milliGRAM(s) Oral daily  ondansetron Injectable 4 milliGRAM(s) IV Push once  oxyCODONE    IR 5 milliGRAM(s) Oral every 6 hours PRN  pantoprazole  Injectable 40 milliGRAM(s) IV Push daily  piperacillin/tazobactam IVPB.. 3.375 Gram(s) IV Intermittent every 8 hours      O:  Vital Signs Last 24 Hrs  T(C): 36.9 (26 Feb 2024 07:23), Max: 37.3 (25 Feb 2024 11:24)  T(F): 98.4 (26 Feb 2024 07:23), Max: 99.2 (25 Feb 2024 13:25)  HR: 72 (26 Feb 2024 07:23) (70 - 100)  BP: 115/66 (26 Feb 2024 07:23) (102/64 - 154/80)  BP(mean): 82 (25 Feb 2024 20:01) (82 - 82)  RR: 18 (26 Feb 2024 07:23) (13 - 18)  SpO2: 97% (26 Feb 2024 07:23) (94% - 99%)    Parameters below as of 26 Feb 2024 07:23  Patient On (Oxygen Delivery Method): room air        I&O SUMMARY:    02-25-24 @ 07:01  -  02-26-24 @ 07:00  --------------------------------------------------------  IN: 2500 mL / OUT: 1685 mL / NET: 815 mL        PHYSICAL EXAM:  Lungs: CTA bilat without W/R/R  Card: S1S2  Abd: Soft, appropriate ttp around incision sites, No hematoma. slightly distended.  No rebound/guarding. Drain in RLQ, SS output. Bulb to suction.     Ext: Calves soft, NT, without edema bilat  : francisco, clear yellow urine in bag.      LABS:                        11.8   11.50 )-----------( 189      ( 26 Feb 2024 06:30 )             37.8     02-26    137  |  102  |  14  ----------------------------<  179<H>  4.2   |  28  |  1.17    Ca    8.3<L>      26 Feb 2024 06:30    TPro  7.2  /  Alb  3.6  /  TBili  0.4  /  DBili  x   /  AST  21  /  ALT  32  /  AlkPhos  57  02-25          A: 63y M with hx of  Right hemicolectomy for colon Ca (performed by Dr Dayo Soto) in August 2023  presented with acute onset right sided abdominal pain, POD#1 s/p Diagnostic laparoscopy with small bowel resection for pneumoperitoneum.  Pt tolerated procedure well. Tolerating sips of water without nausea/vomiting.    Afebrile, hemodynamically stable.   Leukocytosis improving on Zosyn.   Drain 110cc SS overnight, 125cc total since OR.   UOP: 1000cc on francisco.       P:  - NPO with sips of water, ice chips only for now  - continue zosyn  - Continue home med  - Pain control  - OOB ad ana rosa  - Possible Trial of void today  - DVT ppx  - Monitor drain output and drain care  - Monitor bowel function  - Case and plan to be discussed with Dr. Gaviria

## 2024-02-26 NOTE — PROGRESS NOTE ADULT - SUBJECTIVE AND OBJECTIVE BOX
POD # 1 s/p Dx Laparoscopy with resection of ileal segment (blind end of Ileo-colic anastomosis  Pt states he was quite uncomfortable throughout the day but only began feeling better around 7pm tonight.    Vital Signs Last 24 Hrs  T(C): 36.7 (26 Feb 2024 20:38), Max: 36.9 (26 Feb 2024 07:23)  T(F): 98.1 (26 Feb 2024 20:38), Max: 98.4 (26 Feb 2024 07:23)  HR: 80 (26 Feb 2024 20:38) (70 - 80)  BP: 148/84 (26 Feb 2024 20:38) (102/64 - 148/84)  BP(mean): --  RR: 18 (26 Feb 2024 20:38) (17 - 18)  SpO2: 93% (26 Feb 2024 20:38) (92% - 98%)    Parameters below as of 26 Feb 2024 20:38  Patient On (Oxygen Delivery Method): room air        02-25 @ 07:01 - 02-26 @ 07:00  --------------------------------------------------------  IN: 2500 mL / OUT: 1685 mL / NET: 815 mL    02-26 @ 07:01 - 02-26 @ 22:13  --------------------------------------------------------  IN: 0 mL / OUT: 1800 mL / NET: -1800 mL                              11.8   11.50 )-----------( 189      ( 26 Feb 2024 06:30 )             37.8                         13.8   12.55 )-----------( 244      ( 25 Feb 2024 06:40 )             43.2       02-26    137  |  102  |  14  ----------------------------<  179<H>  4.2   |  28  |  1.17    Ca    8.3<L>      26 Feb 2024 06:30    TPro  7.2  /  Alb  3.6  /  TBili  0.4  /  DBili  x   /  AST  21  /  ALT  32  /  AlkPhos  57  02-25      Physical Exam:  Awake alert and oriented x3 in no acute distress. NCAT, PERRLA, EOMI  Lungs clear bilaterally without wheezes rhonchi or rales.  Regular rate and rhythm  Abdomen soft, nontender, nondistended, positive bowel sounds in all 4 quadrants. No evidence of hernia or masses. Surgical incisions remained well approximated and healing appropriately without erythema, induration or fluctuance. Dressings remained clean dry and intact.  Placido drain to bulb suction.  Remains serosanguinous.  Extremities without edema or tenderness.

## 2024-02-26 NOTE — CARE COORDINATION ASSESSMENT. - NSPASTMEDSURGHISTORY_GEN_ALL_CORE_FT
PAST MEDICAL & SURGICAL HISTORY:  Diabetes      Hypertension      Colon cancer      History of colon resection

## 2024-02-26 NOTE — CARE COORDINATION ASSESSMENT. - OTHER PERTINENT DISCHARGE PLANNING INFORMATION:
RN CM met with pt. for assessment; transition of care planning at bedside, lives with family in private home;1STE to enter; 0STE to bedroom;  A&O x4;  CM role and DC planning process explained; verbalized understanding.  Pt. reports; ambulates on own power; independent in stairs, ADLs/ iADLs prior to s/p;  Denies use of RW at home;  NO DME NEEDS and SKILLED NEEDS at time of assessment;   TRANSITION OF CARE PLAN  Patient is able to self-direct own DC planning; DC to home; spouse Tia will assume care; NO SKILLED NEEDS AT TIME OF ASSESSMENT; patient in agreement; To follow up with MD post DC;  will arrange own transport at DC. NO DME NEEDS;

## 2024-02-26 NOTE — PROGRESS NOTE ADULT - SUBJECTIVE AND OBJECTIVE BOX
Patient is a 63y old  Male who presents with a chief complaint of Abdominal pain.  Free intraperitoneal air (26 Feb 2024 09:01)      INTERVAL HPI/OVERNIGHT EVENTS:    s/p procedure  no overnight events    MEDICATIONS  (STANDING):  atorvastatin 40 milliGRAM(s) Oral at bedtime  cefoTEtan  IVPB 2 Gram(s) IV Intermittent once  dextrose 5%. 1000 milliLiter(s) (100 mL/Hr) IV Continuous <Continuous>  dextrose 5%. 1000 milliLiter(s) (50 mL/Hr) IV Continuous <Continuous>  dextrose 50% Injectable 25 Gram(s) IV Push once  dextrose 50% Injectable 12.5 Gram(s) IV Push once  dextrose 50% Injectable 25 Gram(s) IV Push once  glucagon  Injectable 1 milliGRAM(s) IntraMuscular once  insulin lispro (ADMELOG) corrective regimen sliding scale   SubCutaneous every 6 hours  lactated ringers. 1000 milliLiter(s) (150 mL/Hr) IV Continuous <Continuous>  lisinopril 10 milliGRAM(s) Oral daily  ondansetron Injectable 4 milliGRAM(s) IV Push once  pantoprazole  Injectable 40 milliGRAM(s) IV Push daily  piperacillin/tazobactam IVPB.. 3.375 Gram(s) IV Intermittent every 8 hours    MEDICATIONS  (PRN):  dextrose Oral Gel 15 Gram(s) Oral once PRN Blood Glucose LESS THAN 70 milliGRAM(s)/deciliter  HYDROmorphone  Injectable 0.5 milliGRAM(s) IV Push every 4 hours PRN Severe Pain (7 - 10)  oxyCODONE    IR 5 milliGRAM(s) Oral every 6 hours PRN Moderate Pain (4 - 6)      Allergies    No Known Allergies    Intolerances        REVIEW OF SYSTEMS:  CONSTITUTIONAL: No fever, weight loss, or fatigue  EYES: No eye pain, visual disturbances, or discharge  ENMT:  No difficulty hearing, tinnitus, vertigo; No sinus or throat pain  NECK: No pain or stiffness  BREASTS: No pain, masses, or nipple discharge  RESPIRATORY: No cough, wheezing, chills or hemoptysis; No shortness of breath  CARDIOVASCULAR: No chest pain, palpitations, lightheadedness, or leg swelling  GASTROINTESTINAL: No abdominal or epigastric pain. No nausea, vomiting, or hematemesis; No diarrhea or constipation. No melena or hematochezia.  GENITOURINARY: No dysuria, frequency, hematuria, or incontinence  NEUROLOGICAL: No headaches, memory loss, vertigo, loss of strength, numbness, or tremors  SKIN: No itching, burning, rashes, or lesions   LYMPH NODES: No enlarged glands  ENDOCRINE: No heat or cold intolerance; No hair loss; No polydipsia or polyuria  MUSCULOSKELETAL: No joint pain or swelling; No muscle, back, or extremity pain  PSYCHIATRIC: No depression, anxiety, or mood swings  HEME/LYMPH: No easy bruising, or bleeding gums  ALLERGY AND IMMUNOLOGIC: No hives or eczema    Vital Signs Last 24 Hrs  T(C): 36.9 (26 Feb 2024 07:23), Max: 37.3 (25 Feb 2024 11:24)  T(F): 98.4 (26 Feb 2024 07:23), Max: 99.2 (25 Feb 2024 13:25)  HR: 72 (26 Feb 2024 07:23) (70 - 100)  BP: 115/66 (26 Feb 2024 07:23) (102/64 - 154/80)  BP(mean): 82 (25 Feb 2024 20:01) (82 - 82)  RR: 18 (26 Feb 2024 07:23) (13 - 18)  SpO2: 97% (26 Feb 2024 07:23) (94% - 99%)    Parameters below as of 26 Feb 2024 07:23  Patient On (Oxygen Delivery Method): room air        PHYSICAL EXAM:  GENERAL: NAD, well-groomed, well-developed  HEAD:  Atraumatic, Normocephalic  EYES: EOMI, PERRLA, conjunctiva and sclera clear  ENMT: Moist mucous membranes, No lesions; No tonsillar erythema, exudates, or enlargement  NECK: Supple, No JVD, Normal thyroid  NERVOUS SYSTEM:  Alert & Oriented X3, Good concentration; All 4 extremities mobile, no gross sensory deficits.   CHEST/LUNG: Clear to auscultation bilaterally; No rales, rhonchi, wheezing, or rubs  HEART: Regular rate and rhythm; No murmurs, rubs, or gallops  ABDOMEN: Soft, Nontender, Nondistended; Bowel sounds present  EXTREMITIES:  2+ Peripheral Pulses, No clubbing, cyanosis, or edema  LYMPH: No lymphadenopathy noted  SKIN: No rashes or lesions    LABS:                        11.8   11.50 )-----------( 189      ( 26 Feb 2024 06:30 )             37.8     26 Feb 2024 06:30    137    |  102    |  14     ----------------------------<  179    4.2     |  28     |  1.17     Ca    8.3        26 Feb 2024 06:30        Urinalysis Basic - ( 26 Feb 2024 06:30 )    Color: x / Appearance: x / SG: x / pH: x  Gluc: 179 mg/dL / Ketone: x  / Bili: x / Urobili: x   Blood: x / Protein: x / Nitrite: x   Leuk Esterase: x / RBC: x / WBC x   Sq Epi: x / Non Sq Epi: x / Bacteria: x      CAPILLARY BLOOD GLUCOSE      POCT Blood Glucose.: 167 mg/dL (26 Feb 2024 05:52)  POCT Blood Glucose.: 116 mg/dL (25 Feb 2024 23:43)  POCT Blood Glucose.: 222 mg/dL (25 Feb 2024 18:34)  POCT Blood Glucose.: 235 mg/dL (25 Feb 2024 13:27)  POCT Blood Glucose.: 277 mg/dL (25 Feb 2024 11:22)      RADIOLOGY & ADDITIONAL TESTS:

## 2024-02-26 NOTE — PATIENT PROFILE ADULT - NSPROHMDIABETMGMTSTRAT_GEN_A_NUR
activity/adequate rest/blood glucose testing/coping strategies/diet modification/exercise/insulin therapy/routine screenings

## 2024-02-26 NOTE — CARE COORDINATION ASSESSMENT. - NSCAREPROVIDERS_GEN_ALL_CORE_FT
CARE PROVIDERS:  Accepting Physician: Erik Gaviria  Administration: Kiet Goldstein  Administration: Zacarias Rojas  Admitting: Erik Gaviria  Attending: Erik Gaviria  Case Management: Santaromana, Anna  Consultant: Maria Del Rosario Morgan  Consultant: Victoriano Root  Consultant: Perlman, Craig  Consultant: Erik Gaviria  ED Attending: Abiodun Valdovinos ED Nurse: Corky Singh  Infection Control: Neela Hicks  Nurse: Maria Del Carmen Vargas  Nurse: Yesica Ruano  Nurse: Yoselyn Ashley  Nurse: Sravanthi Gerardo  Ordered: Physician, Ordering  PCA/Nursing Assistant: Tennille Ballesteros  Primary Team: Praveen Mayen  Registered Dietitian: Bianca Matute  Respiratory Therapy: Tanisha Juárez  Respiratory Therapy: Jarek Siegel  : Antonette Calabrese  Team: ROSELYN WHITT Hospitalists, Team  UR// Supp. Assoc.: Barbie Grewal

## 2024-02-27 LAB
ANION GAP SERPL CALC-SCNC: 10 MMOL/L — SIGNIFICANT CHANGE UP (ref 5–17)
BUN SERPL-MCNC: 7 MG/DL — SIGNIFICANT CHANGE UP (ref 7–23)
CALCIUM SERPL-MCNC: 8.7 MG/DL — SIGNIFICANT CHANGE UP (ref 8.4–10.5)
CHLORIDE SERPL-SCNC: 104 MMOL/L — SIGNIFICANT CHANGE UP (ref 96–108)
CO2 SERPL-SCNC: 26 MMOL/L — SIGNIFICANT CHANGE UP (ref 22–31)
CREAT SERPL-MCNC: 0.79 MG/DL — SIGNIFICANT CHANGE UP (ref 0.5–1.3)
EGFR: 100 ML/MIN/1.73M2 — SIGNIFICANT CHANGE UP
GLUCOSE BLDC GLUCOMTR-MCNC: 141 MG/DL — HIGH (ref 70–99)
GLUCOSE BLDC GLUCOMTR-MCNC: 152 MG/DL — HIGH (ref 70–99)
GLUCOSE BLDC GLUCOMTR-MCNC: 159 MG/DL — HIGH (ref 70–99)
GLUCOSE BLDC GLUCOMTR-MCNC: 183 MG/DL — HIGH (ref 70–99)
GLUCOSE SERPL-MCNC: 160 MG/DL — HIGH (ref 70–99)
HCT VFR BLD CALC: 36.6 % — LOW (ref 39–50)
HGB BLD-MCNC: 11.7 G/DL — LOW (ref 13–17)
MCHC RBC-ENTMCNC: 27.5 PG — SIGNIFICANT CHANGE UP (ref 27–34)
MCHC RBC-ENTMCNC: 32 GM/DL — SIGNIFICANT CHANGE UP (ref 32–36)
MCV RBC AUTO: 86.1 FL — SIGNIFICANT CHANGE UP (ref 80–100)
NRBC # BLD: 0 /100 WBCS — SIGNIFICANT CHANGE UP (ref 0–0)
PLATELET # BLD AUTO: 191 K/UL — SIGNIFICANT CHANGE UP (ref 150–400)
POTASSIUM SERPL-MCNC: 4 MMOL/L — SIGNIFICANT CHANGE UP (ref 3.5–5.3)
POTASSIUM SERPL-SCNC: 4 MMOL/L — SIGNIFICANT CHANGE UP (ref 3.5–5.3)
RBC # BLD: 4.25 M/UL — SIGNIFICANT CHANGE UP (ref 4.2–5.8)
RBC # FLD: 13.2 % — SIGNIFICANT CHANGE UP (ref 10.3–14.5)
SODIUM SERPL-SCNC: 140 MMOL/L — SIGNIFICANT CHANGE UP (ref 135–145)
WBC # BLD: 8.77 K/UL — SIGNIFICANT CHANGE UP (ref 3.8–10.5)
WBC # FLD AUTO: 8.77 K/UL — SIGNIFICANT CHANGE UP (ref 3.8–10.5)

## 2024-02-27 PROCEDURE — 99024 POSTOP FOLLOW-UP VISIT: CPT

## 2024-02-27 PROCEDURE — 99233 SBSQ HOSP IP/OBS HIGH 50: CPT

## 2024-02-27 RX ADMIN — Medication 2: at 17:14

## 2024-02-27 RX ADMIN — PIPERACILLIN AND TAZOBACTAM 25 GRAM(S): 4; .5 INJECTION, POWDER, LYOPHILIZED, FOR SOLUTION INTRAVENOUS at 08:55

## 2024-02-27 RX ADMIN — HYDROMORPHONE HYDROCHLORIDE 0.5 MILLIGRAM(S): 2 INJECTION INTRAMUSCULAR; INTRAVENOUS; SUBCUTANEOUS at 21:15

## 2024-02-27 RX ADMIN — ATORVASTATIN CALCIUM 40 MILLIGRAM(S): 80 TABLET, FILM COATED ORAL at 21:09

## 2024-02-27 RX ADMIN — Medication 2: at 12:15

## 2024-02-27 RX ADMIN — PIPERACILLIN AND TAZOBACTAM 25 GRAM(S): 4; .5 INJECTION, POWDER, LYOPHILIZED, FOR SOLUTION INTRAVENOUS at 17:13

## 2024-02-27 RX ADMIN — Medication 975 MILLIGRAM(S): at 06:21

## 2024-02-27 RX ADMIN — HYDROMORPHONE HYDROCHLORIDE 0.5 MILLIGRAM(S): 2 INJECTION INTRAMUSCULAR; INTRAVENOUS; SUBCUTANEOUS at 15:41

## 2024-02-27 RX ADMIN — HYDROMORPHONE HYDROCHLORIDE 0.5 MILLIGRAM(S): 2 INJECTION INTRAMUSCULAR; INTRAVENOUS; SUBCUTANEOUS at 11:03

## 2024-02-27 RX ADMIN — PANTOPRAZOLE SODIUM 40 MILLIGRAM(S): 20 TABLET, DELAYED RELEASE ORAL at 12:15

## 2024-02-27 RX ADMIN — Medication 975 MILLIGRAM(S): at 00:38

## 2024-02-27 RX ADMIN — HYDROMORPHONE HYDROCHLORIDE 0.5 MILLIGRAM(S): 2 INJECTION INTRAMUSCULAR; INTRAVENOUS; SUBCUTANEOUS at 11:18

## 2024-02-27 RX ADMIN — LISINOPRIL 10 MILLIGRAM(S): 2.5 TABLET ORAL at 06:21

## 2024-02-27 RX ADMIN — ENOXAPARIN SODIUM 40 MILLIGRAM(S): 100 INJECTION SUBCUTANEOUS at 17:15

## 2024-02-27 RX ADMIN — HYDROMORPHONE HYDROCHLORIDE 0.5 MILLIGRAM(S): 2 INJECTION INTRAMUSCULAR; INTRAVENOUS; SUBCUTANEOUS at 15:56

## 2024-02-27 RX ADMIN — HYDROMORPHONE HYDROCHLORIDE 0.5 MILLIGRAM(S): 2 INJECTION INTRAMUSCULAR; INTRAVENOUS; SUBCUTANEOUS at 20:56

## 2024-02-27 RX ADMIN — Medication 975 MILLIGRAM(S): at 00:52

## 2024-02-27 RX ADMIN — Medication 975 MILLIGRAM(S): at 06:33

## 2024-02-27 RX ADMIN — PIPERACILLIN AND TAZOBACTAM 25 GRAM(S): 4; .5 INJECTION, POWDER, LYOPHILIZED, FOR SOLUTION INTRAVENOUS at 01:02

## 2024-02-27 NOTE — PROGRESS NOTE ADULT - SUBJECTIVE AND OBJECTIVE BOX
CAPILLARY BLOOD GLUCOSE      POCT Blood Glucose.: 141 mg/dL (27 Feb 2024 07:40)  POCT Blood Glucose.: 138 mg/dL (26 Feb 2024 21:13)  POCT Blood Glucose.: 162 mg/dL (26 Feb 2024 17:29)  POCT Blood Glucose.: 157 mg/dL (26 Feb 2024 11:54)      Vital Signs Last 24 Hrs  T(C): 36.8 (27 Feb 2024 07:44), Max: 36.8 (26 Feb 2024 15:53)  T(F): 98.3 (27 Feb 2024 07:44), Max: 98.3 (26 Feb 2024 15:53)  HR: 73 (27 Feb 2024 07:44) (73 - 80)  BP: 162/82 (27 Feb 2024 07:44) (129/76 - 162/82)  BP(mean): --  RR: 18 (27 Feb 2024 07:44) (17 - 18)  SpO2: 94% (27 Feb 2024 07:44) (92% - 94%)    Parameters below as of 27 Feb 2024 07:44  Patient On (Oxygen Delivery Method): room air        General: WN/WD NAD  Respiratory: CTA B/L  CV: RRR, S1S2, no murmurs, rubs or gallops  Abdominal: Soft, NT, ND +BS, Last BM  Extremities: No edema, + peripheral pulses     02-27    140  |  104  |  7   ----------------------------<  160<H>  4.0   |  26  |  0.79    Ca    8.7      27 Feb 2024 06:00        atorvastatin 40 milliGRAM(s) Oral at bedtime  dextrose 50% Injectable 25 Gram(s) IV Push once  dextrose 50% Injectable 25 Gram(s) IV Push once  dextrose 50% Injectable 12.5 Gram(s) IV Push once  dextrose Oral Gel 15 Gram(s) Oral once PRN  glucagon  Injectable 1 milliGRAM(s) IntraMuscular once  insulin lispro (ADMELOG) corrective regimen sliding scale   SubCutaneous at bedtime  insulin lispro (ADMELOG) corrective regimen sliding scale   SubCutaneous three times a day before meals

## 2024-02-27 NOTE — SOCIAL WORK PROGRESS NOTE - NSSWPROGRESSNOTE_GEN_ALL_CORE
was asked to meet with patient by patient experience. I met with patient and wife bedside. Patient seems to be coping well with admission. SW provided support and will remain available. Family(wife) bedside seems very supportive and involved. Plan for home when ready sw will remain available

## 2024-02-27 NOTE — PROGRESS NOTE ADULT - NS ATTEND AMEND GEN_ALL_CORE FT
Case discussed with PA this morning pt seen by me independently this evening.  Resting comfortably.  Passing flatus.  Tolerating full liquid diet but appetite remains poor.  Pain almost completely resolved.  Incisions clean and dry.  Drain pale serosanguinous.  Discharge planning by end of week.

## 2024-02-27 NOTE — PROGRESS NOTE ADULT - SUBJECTIVE AND OBJECTIVE BOX
INTERVAL HPI/OVERNIGHT EVENTS:  POD#2 laparoscopy with resection of ileal segment for perforated bowel    No acute overnight events  Tolerating clears  Passing flatus  Voiding  Ambulation  Abd pain tolerable      MEDICATIONS  (STANDING):  atorvastatin 40 milliGRAM(s) Oral at bedtime  cefoTEtan  IVPB 2 Gram(s) IV Intermittent once  dextrose 5%. 1000 milliLiter(s) (100 mL/Hr) IV Continuous <Continuous>  dextrose 5%. 1000 milliLiter(s) (50 mL/Hr) IV Continuous <Continuous>  dextrose 50% Injectable 25 Gram(s) IV Push once  dextrose 50% Injectable 12.5 Gram(s) IV Push once  dextrose 50% Injectable 25 Gram(s) IV Push once  glucagon  Injectable 1 milliGRAM(s) IntraMuscular once  insulin lispro (ADMELOG) corrective regimen sliding scale   SubCutaneous every 6 hours  lactated ringers. 1000 milliLiter(s) (150 mL/Hr) IV Continuous <Continuous>  lisinopril 10 milliGRAM(s) Oral daily  ondansetron Injectable 4 milliGRAM(s) IV Push once  pantoprazole  Injectable 40 milliGRAM(s) IV Push daily  piperacillin/tazobactam IVPB.. 3.375 Gram(s) IV Intermittent every 8 hours    MEDICATIONS  (PRN):  dextrose Oral Gel 15 Gram(s) Oral once PRN Blood Glucose LESS THAN 70 milliGRAM(s)/deciliter  HYDROmorphone  Injectable 0.5 milliGRAM(s) IV Push every 4 hours PRN Severe Pain (7 - 10)  oxyCODONE    IR 5 milliGRAM(s) Oral every 6 hours PRN Moderate Pain (4 - 6)      Allergies    No Known Allergies    Intolerances        REVIEW OF SYSTEMS:  negative other than aforementioned    Vital Signs Last 24 Hrs  T(C): 36.9 (26 Feb 2024 07:23), Max: 37.3 (25 Feb 2024 11:24)  T(F): 98.4 (26 Feb 2024 07:23), Max: 99.2 (25 Feb 2024 13:25)  HR: 72 (26 Feb 2024 07:23) (70 - 100)  BP: 115/66 (26 Feb 2024 07:23) (102/64 - 154/80)  BP(mean): 82 (25 Feb 2024 20:01) (82 - 82)  RR: 18 (26 Feb 2024 07:23) (13 - 18)  SpO2: 97% (26 Feb 2024 07:23) (94% - 99%)    Parameters below as of 26 Feb 2024 07:23  Patient On (Oxygen Delivery Method): room air        PHYSICAL EXAM:  GENERAL: NAD   HEAD:  Atraumatic, Normocephalic  EYES: EOMI, PERRLA, conjunctiva and sclera clear  ENMT: Moist mucous membranes, No lesions; No tonsillar erythema, exudates, or enlargement  NECK: Supple, No JVD, Normal thyroid  NERVOUS SYSTEM:  Alert & Oriented X3, Good concentration; All 4 extremities mobile, no gross sensory deficits.   CHEST/LUNG: Clear to auscultation bilaterally; No rales, rhonchi, wheezing, or rubs  HEART: Regular rate and rhythm; No murmurs, rubs, or gallops  ABDOMEN: abdomen expected ttp, incisions clean and dry      LABS:                        11.8   11.50 )-----------( 189      ( 26 Feb 2024 06:30 )             37.8     26 Feb 2024 06:30    137    |  102    |  14     ----------------------------<  179    4.2     |  28     |  1.17     Ca    8.3        26 Feb 2024 06:30        Urinalysis Basic - ( 26 Feb 2024 06:30 )    Color: x / Appearance: x / SG: x / pH: x  Gluc: 179 mg/dL / Ketone: x  / Bili: x / Urobili: x   Blood: x / Protein: x / Nitrite: x   Leuk Esterase: x / RBC: x / WBC x   Sq Epi: x / Non Sq Epi: x / Bacteria: x      CAPILLARY BLOOD GLUCOSE      POCT Blood Glucose.: 167 mg/dL (26 Feb 2024 05:52)  POCT Blood Glucose.: 116 mg/dL (25 Feb 2024 23:43)  POCT Blood Glucose.: 222 mg/dL (25 Feb 2024 18:34)  POCT Blood Glucose.: 235 mg/dL (25 Feb 2024 13:27)  POCT Blood Glucose.: 277 mg/dL (25 Feb 2024 11:22)      RADIOLOGY & ADDITIONAL TESTS:

## 2024-02-27 NOTE — PROGRESS NOTE ADULT - PROBLEM SELECTOR PLAN 1
cont mod dose admelog corrective scale coverage q6hrs while npo  insulin pump on hold during hospitalization  outpatient anti-diabetes regimen on hold  goal bg 100-180 in hosp setting.

## 2024-02-27 NOTE — PROGRESS NOTE ADULT - SUBJECTIVE AND OBJECTIVE BOX
SURGERY Progress Note PA    62y/o  POD # 2 s/p Dx Laparoscopy with resection of ileal segment (blind end of Ileo-colic anastomosis  SUBJECTIVE:   Patient seen at bedside, no overnight events, no complaints noted and pain is controlled at this time.   Patient admits to flatus, no bowel movement since Sunday morning before surgery.   Patient denies any headaches, chest pain, shortness of breath, nausea, vomiting, fever, chills, weakness, dysuria  Patient A+Ox3 in NAD at time of visit.    OBJECTIVE:   T(C): 36.8 (02-27-24 @ 07:44), Max: 36.8 (02-26-24 @ 15:53)  HR: 73 (02-27-24 @ 07:44) (73 - 80)  BP: 162/82 (02-27-24 @ 07:44) (129/76 - 162/82)  RR: 18 (02-27-24 @ 07:44) (17 - 18)  SpO2: 94% (02-27-24 @ 07:44) (92% - 94%)  CAPILLARY BLOOD GLUCOSE      POCT Blood Glucose.: 141 mg/dL (27 Feb 2024 07:40)  POCT Blood Glucose.: 138 mg/dL (26 Feb 2024 21:13)  POCT Blood Glucose.: 162 mg/dL (26 Feb 2024 17:29)  POCT Blood Glucose.: 157 mg/dL (26 Feb 2024 11:54)    I&O's Detail    26 Feb 2024 07:01  -  27 Feb 2024 07:00  --------------------------------------------------------  IN:    IV PiggyBack: 100 mL    Lactated Ringers: 1050 mL  Total IN: 1150 mL    OUT:    Drain (mL): 90 mL    Indwelling Catheter - Urethral (mL): 1800 mL    Voided (mL): 1700 mL  Total OUT: 3590 mL    Total NET: -2440 mL    Physical exam:  General: A+O x 3 in NAD  HEENT: PERRLA, EOM intact  Neck: trachea midline  Chest: Clear through auscultation bilaterally, No rales, rhonchi wheezes noted bilaterally  Heart: S1,S1 RRR, no murmurs noted  Abdomen: soft non distended, tender at incision and drain site, non tympanic, BS x 4  Incisions/drain: intact, no erythema noted, drain 10cc serosanguinous  Extremities: no edema noted, warm,  no calf tenderness     MEDICATIONS  (STANDING):  acetaminophen     Tablet .. 975 milliGRAM(s) Oral every 6 hours  atorvastatin 40 milliGRAM(s) Oral at bedtime  cefoTEtan  IVPB 2 Gram(s) IV Intermittent once  dextrose 5%. 1000 milliLiter(s) (100 mL/Hr) IV Continuous <Continuous>  dextrose 5%. 1000 milliLiter(s) (50 mL/Hr) IV Continuous <Continuous>  dextrose 50% Injectable 25 Gram(s) IV Push once  dextrose 50% Injectable 12.5 Gram(s) IV Push once  dextrose 50% Injectable 25 Gram(s) IV Push once  enoxaparin Injectable 40 milliGRAM(s) SubCutaneous every 24 hours  glucagon  Injectable 1 milliGRAM(s) IntraMuscular once  influenza   Vaccine 0.5 milliLiter(s) IntraMuscular once  insulin lispro (ADMELOG) corrective regimen sliding scale   SubCutaneous at bedtime  insulin lispro (ADMELOG) corrective regimen sliding scale   SubCutaneous three times a day before meals  lactated ringers. 1000 milliLiter(s) (150 mL/Hr) IV Continuous <Continuous>  lisinopril 10 milliGRAM(s) Oral daily  ondansetron Injectable 4 milliGRAM(s) IV Push once  pantoprazole  Injectable 40 milliGRAM(s) IV Push daily  piperacillin/tazobactam IVPB.. 3.375 Gram(s) IV Intermittent every 8 hours    MEDICATIONS  (PRN):  dextrose Oral Gel 15 Gram(s) Oral once PRN Blood Glucose LESS THAN 70 milliGRAM(s)/deciliter  HYDROmorphone  Injectable 0.5 milliGRAM(s) IV Push every 4 hours PRN Severe Pain (7 - 10)  ondansetron   Disintegrating Tablet 4 milliGRAM(s) Oral once PRN Nausea  oxyCODONE    IR 5 milliGRAM(s) Oral every 6 hours PRN Moderate Pain (4 - 6)      LABS:                        11.7   8.77  )-----------( 191      ( 27 Feb 2024 06:00 )             36.6     02-27    140  |  104  |  7   ----------------------------<  160<H>  4.0   |  26  |  0.79    Ca    8.7      27 Feb 2024 06:00        Urinalysis Basic - ( 27 Feb 2024 06:00 )    Color: x / Appearance: x / SG: x / pH: x  Gluc: 160 mg/dL / Ketone: x  / Bili: x / Urobili: x   Blood: x / Protein: x / Nitrite: x   Leuk Esterase: x / RBC: x / WBC x   Sq Epi: x / Non Sq Epi: x / Bacteria: x        RADIOLOGY & ADDITIONAL STUDIES:    Assessment/Plan  Patient is a 63y old male who presented with a chief complaint of Abdominal pain.  Free intraperitoneal air   62y/o  POD # 2 s/p Dx Laparoscopy with resection of ileal segment (blind end of Ileo-colic anastomosis  Continue current care  Diet - clears  Labs in AM - WBC nl this AM, Chem Nl, Glucose 160  GI/DVT prophylaxis  Analgesia prn  OOB/ambulation on the floor   Incentive spirometry/Cough/Deep breathing exercises  Sequentials  Consults  d/c planning to home  Will discuss with Dr. Gaviria       SURGERY Progress Note PA    64y/o  POD # 2 s/p Dx Laparoscopy with resection of ileal segment (blind end of Ileo-colic anastomosis  SUBJECTIVE:   Patient seen at bedside, no overnight events, no complaints noted and pain is controlled at this time.   Patient admits to flatus, no bowel movement since Sunday morning before surgery.   Patient denies any headaches, chest pain, shortness of breath, nausea, vomiting, fever, chills, weakness, dysuria  Patient A+Ox3 in NAD at time of visit.    OBJECTIVE:   T(C): 36.8 (02-27-24 @ 07:44), Max: 36.8 (02-26-24 @ 15:53)  HR: 73 (02-27-24 @ 07:44) (73 - 80)  BP: 162/82 (02-27-24 @ 07:44) (129/76 - 162/82)  RR: 18 (02-27-24 @ 07:44) (17 - 18)  SpO2: 94% (02-27-24 @ 07:44) (92% - 94%)  CAPILLARY BLOOD GLUCOSE      POCT Blood Glucose.: 141 mg/dL (27 Feb 2024 07:40)  POCT Blood Glucose.: 138 mg/dL (26 Feb 2024 21:13)  POCT Blood Glucose.: 162 mg/dL (26 Feb 2024 17:29)  POCT Blood Glucose.: 157 mg/dL (26 Feb 2024 11:54)    I&O's Detail    26 Feb 2024 07:01  -  27 Feb 2024 07:00  --------------------------------------------------------  IN:    IV PiggyBack: 100 mL    Lactated Ringers: 1050 mL  Total IN: 1150 mL    OUT:    Drain (mL): 90 mL    Indwelling Catheter - Urethral (mL): 1800 mL    Voided (mL): 1700 mL  Total OUT: 3590 mL    Total NET: -2440 mL    Physical exam:  General: A+O x 3 in NAD  HEENT: PERRLA, EOM intact  Neck: trachea midline  Chest: Clear through auscultation bilaterally, No rales, rhonchi wheezes noted bilaterally  Heart: S1,S1 RRR, no murmurs noted  Abdomen: soft non distended, tender at incision and drain site, non tympanic, BS x 4  Incisions/drain: intact, no erythema noted, drain 90cc serosanguinous at 6a reported  Extremities: no edema noted, warm,  no calf tenderness     MEDICATIONS  (STANDING):  acetaminophen     Tablet .. 975 milliGRAM(s) Oral every 6 hours  atorvastatin 40 milliGRAM(s) Oral at bedtime  cefoTEtan  IVPB 2 Gram(s) IV Intermittent once  dextrose 5%. 1000 milliLiter(s) (100 mL/Hr) IV Continuous <Continuous>  dextrose 5%. 1000 milliLiter(s) (50 mL/Hr) IV Continuous <Continuous>  dextrose 50% Injectable 25 Gram(s) IV Push once  dextrose 50% Injectable 12.5 Gram(s) IV Push once  dextrose 50% Injectable 25 Gram(s) IV Push once  enoxaparin Injectable 40 milliGRAM(s) SubCutaneous every 24 hours  glucagon  Injectable 1 milliGRAM(s) IntraMuscular once  influenza   Vaccine 0.5 milliLiter(s) IntraMuscular once  insulin lispro (ADMELOG) corrective regimen sliding scale   SubCutaneous at bedtime  insulin lispro (ADMELOG) corrective regimen sliding scale   SubCutaneous three times a day before meals  lactated ringers. 1000 milliLiter(s) (150 mL/Hr) IV Continuous <Continuous>  lisinopril 10 milliGRAM(s) Oral daily  ondansetron Injectable 4 milliGRAM(s) IV Push once  pantoprazole  Injectable 40 milliGRAM(s) IV Push daily  piperacillin/tazobactam IVPB.. 3.375 Gram(s) IV Intermittent every 8 hours    MEDICATIONS  (PRN):  dextrose Oral Gel 15 Gram(s) Oral once PRN Blood Glucose LESS THAN 70 milliGRAM(s)/deciliter  HYDROmorphone  Injectable 0.5 milliGRAM(s) IV Push every 4 hours PRN Severe Pain (7 - 10)  ondansetron   Disintegrating Tablet 4 milliGRAM(s) Oral once PRN Nausea  oxyCODONE    IR 5 milliGRAM(s) Oral every 6 hours PRN Moderate Pain (4 - 6)      LABS:                        11.7   8.77  )-----------( 191      ( 27 Feb 2024 06:00 )             36.6     02-27    140  |  104  |  7   ----------------------------<  160<H>  4.0   |  26  |  0.79    Ca    8.7      27 Feb 2024 06:00        Urinalysis Basic - ( 27 Feb 2024 06:00 )    Color: x / Appearance: x / SG: x / pH: x  Gluc: 160 mg/dL / Ketone: x  / Bili: x / Urobili: x   Blood: x / Protein: x / Nitrite: x   Leuk Esterase: x / RBC: x / WBC x   Sq Epi: x / Non Sq Epi: x / Bacteria: x        RADIOLOGY & ADDITIONAL STUDIES:    Assessment/Plan  Patient is a 63y old male who presented with a chief complaint of Abdominal pain.  Free intraperitoneal air   64y/o  POD # 2 s/p Dx Laparoscopy with resection of ileal segment (blind end of Ileo-colic anastomosis  Continue current care  Diet - clears  Labs in AM - WBC nl this AM, Chem Nl, Glucose 160  GI/DVT prophylaxis  Analgesia prn  OOB/ambulation on the floor   Incentive spirometry/Cough/Deep breathing exercises  Sequentials  Consults  d/c planning to home  Will discuss with Dr. Gaviria       SURGERY Progress Note PA    62y/o  POD # 2 s/p Dx Laparoscopy with resection of ileal segment (blind end of Ileo-colic anastomosis  SUBJECTIVE:   Patient seen at bedside, no overnight events, no complaints noted and pain is controlled at this time.   Patient admits to flatus, no bowel movement since Sunday morning before surgery.   Patient denies any headaches, chest pain, shortness of breath, nausea, vomiting, fever, chills, weakness, dysuria  Patient A+Ox3 in NAD at time of visit.    OBJECTIVE:   T(C): 36.8 (02-27-24 @ 07:44), Max: 36.8 (02-26-24 @ 15:53)  HR: 73 (02-27-24 @ 07:44) (73 - 80)  BP: 162/82 (02-27-24 @ 07:44) (129/76 - 162/82)  RR: 18 (02-27-24 @ 07:44) (17 - 18)  SpO2: 94% (02-27-24 @ 07:44) (92% - 94%)  CAPILLARY BLOOD GLUCOSE      POCT Blood Glucose.: 141 mg/dL (27 Feb 2024 07:40)  POCT Blood Glucose.: 138 mg/dL (26 Feb 2024 21:13)  POCT Blood Glucose.: 162 mg/dL (26 Feb 2024 17:29)  POCT Blood Glucose.: 157 mg/dL (26 Feb 2024 11:54)    I&O's Detail    26 Feb 2024 07:01  -  27 Feb 2024 07:00  --------------------------------------------------------  IN:    IV PiggyBack: 100 mL    Lactated Ringers: 1050 mL  Total IN: 1150 mL    OUT:    Drain (mL): 90 mL    Indwelling Catheter - Urethral (mL): 1800 mL    Voided (mL): 1700 mL  Total OUT: 3590 mL    Total NET: -2440 mL    Physical exam:  General: A+O x 3 in NAD  HEENT: PERRLA, EOM intact  Neck: trachea midline  Chest: Clear through auscultation bilaterally, No rales, rhonchi wheezes noted bilaterally  Heart: S1,S1 RRR, no murmurs noted  Abdomen: soft non distended, tender at incision and drain site, non tympanic, BS x 4  Incisions/drain: intact, no erythema noted, drain 90cc serosanguinous at 6a reported  Extremities: no edema noted, warm,  no calf tenderness     MEDICATIONS  (STANDING):  acetaminophen     Tablet .. 975 milliGRAM(s) Oral every 6 hours  atorvastatin 40 milliGRAM(s) Oral at bedtime  cefoTEtan  IVPB 2 Gram(s) IV Intermittent once  dextrose 5%. 1000 milliLiter(s) (100 mL/Hr) IV Continuous <Continuous>  dextrose 5%. 1000 milliLiter(s) (50 mL/Hr) IV Continuous <Continuous>  dextrose 50% Injectable 25 Gram(s) IV Push once  dextrose 50% Injectable 12.5 Gram(s) IV Push once  dextrose 50% Injectable 25 Gram(s) IV Push once  enoxaparin Injectable 40 milliGRAM(s) SubCutaneous every 24 hours  glucagon  Injectable 1 milliGRAM(s) IntraMuscular once  influenza   Vaccine 0.5 milliLiter(s) IntraMuscular once  insulin lispro (ADMELOG) corrective regimen sliding scale   SubCutaneous at bedtime  insulin lispro (ADMELOG) corrective regimen sliding scale   SubCutaneous three times a day before meals  lactated ringers. 1000 milliLiter(s) (150 mL/Hr) IV Continuous <Continuous>  lisinopril 10 milliGRAM(s) Oral daily  ondansetron Injectable 4 milliGRAM(s) IV Push once  pantoprazole  Injectable 40 milliGRAM(s) IV Push daily  piperacillin/tazobactam IVPB.. 3.375 Gram(s) IV Intermittent every 8 hours    MEDICATIONS  (PRN):  dextrose Oral Gel 15 Gram(s) Oral once PRN Blood Glucose LESS THAN 70 milliGRAM(s)/deciliter  HYDROmorphone  Injectable 0.5 milliGRAM(s) IV Push every 4 hours PRN Severe Pain (7 - 10)  ondansetron   Disintegrating Tablet 4 milliGRAM(s) Oral once PRN Nausea  oxyCODONE    IR 5 milliGRAM(s) Oral every 6 hours PRN Moderate Pain (4 - 6)      LABS:                        11.7   8.77  )-----------( 191      ( 27 Feb 2024 06:00 )             36.6     02-27    140  |  104  |  7   ----------------------------<  160<H>  4.0   |  26  |  0.79    Ca    8.7      27 Feb 2024 06:00        Urinalysis Basic - ( 27 Feb 2024 06:00 )    Color: x / Appearance: x / SG: x / pH: x  Gluc: 160 mg/dL / Ketone: x  / Bili: x / Urobili: x   Blood: x / Protein: x / Nitrite: x   Leuk Esterase: x / RBC: x / WBC x   Sq Epi: x / Non Sq Epi: x / Bacteria: x        RADIOLOGY & ADDITIONAL STUDIES:    Assessment/Plan  Patient is a 63y old male who presented with a chief complaint of Abdominal pain.  Free intraperitoneal air   POD # 2 s/p Dx Laparoscopy with resection of ileal segment (blind end of Ileo-colic anastomosis  Continue current care  Diet - clears  Labs in AM - WBC nl this AM, Chem Nl, Glucose 160  GI/DVT prophylaxis  Analgesia prn  OOB/ambulation on the floor   Incentive spirometry/Cough/Deep breathing exercises  Sequentials  Consults  d/c planning to home  Will discuss with Dr. Gaviria

## 2024-02-27 NOTE — CASE MANAGEMENT PROGRESS NOTE - NSCMPROGRESSNOTE_GEN_ALL_CORE
Patient discussed in morning rounds, patient is progressing with clear diet and tolerating well. Plan is to progress diet today. Patient is not medically cleared for discharge today.

## 2024-02-28 ENCOUNTER — TRANSCRIPTION ENCOUNTER (OUTPATIENT)
Age: 64
End: 2024-02-28

## 2024-02-28 ENCOUNTER — NON-APPOINTMENT (OUTPATIENT)
Age: 64
End: 2024-02-28

## 2024-02-28 VITALS
OXYGEN SATURATION: 94 % | DIASTOLIC BLOOD PRESSURE: 73 MMHG | RESPIRATION RATE: 18 BRPM | HEART RATE: 62 BPM | SYSTOLIC BLOOD PRESSURE: 132 MMHG | TEMPERATURE: 98 F

## 2024-02-28 LAB
ANION GAP SERPL CALC-SCNC: 6 MMOL/L — SIGNIFICANT CHANGE UP (ref 5–17)
BUN SERPL-MCNC: 8 MG/DL — SIGNIFICANT CHANGE UP (ref 7–23)
CALCIUM SERPL-MCNC: 8.9 MG/DL — SIGNIFICANT CHANGE UP (ref 8.4–10.5)
CHLORIDE SERPL-SCNC: 104 MMOL/L — SIGNIFICANT CHANGE UP (ref 96–108)
CO2 SERPL-SCNC: 28 MMOL/L — SIGNIFICANT CHANGE UP (ref 22–31)
CREAT SERPL-MCNC: 0.93 MG/DL — SIGNIFICANT CHANGE UP (ref 0.5–1.3)
EGFR: 93 ML/MIN/1.73M2 — SIGNIFICANT CHANGE UP
GLUCOSE BLDC GLUCOMTR-MCNC: 149 MG/DL — HIGH (ref 70–99)
GLUCOSE BLDC GLUCOMTR-MCNC: 185 MG/DL — HIGH (ref 70–99)
GLUCOSE SERPL-MCNC: 160 MG/DL — HIGH (ref 70–99)
HCT VFR BLD CALC: 36.8 % — LOW (ref 39–50)
HGB BLD-MCNC: 12 G/DL — LOW (ref 13–17)
MAGNESIUM SERPL-MCNC: 1.7 MG/DL — SIGNIFICANT CHANGE UP (ref 1.6–2.6)
MCHC RBC-ENTMCNC: 27.8 PG — SIGNIFICANT CHANGE UP (ref 27–34)
MCHC RBC-ENTMCNC: 32.6 GM/DL — SIGNIFICANT CHANGE UP (ref 32–36)
MCV RBC AUTO: 85.2 FL — SIGNIFICANT CHANGE UP (ref 80–100)
NRBC # BLD: 0 /100 WBCS — SIGNIFICANT CHANGE UP (ref 0–0)
PLATELET # BLD AUTO: 206 K/UL — SIGNIFICANT CHANGE UP (ref 150–400)
POTASSIUM SERPL-MCNC: 3.9 MMOL/L — SIGNIFICANT CHANGE UP (ref 3.5–5.3)
POTASSIUM SERPL-SCNC: 3.9 MMOL/L — SIGNIFICANT CHANGE UP (ref 3.5–5.3)
RBC # BLD: 4.32 M/UL — SIGNIFICANT CHANGE UP (ref 4.2–5.8)
RBC # FLD: 12.7 % — SIGNIFICANT CHANGE UP (ref 10.3–14.5)
SODIUM SERPL-SCNC: 138 MMOL/L — SIGNIFICANT CHANGE UP (ref 135–145)
WBC # BLD: 8.54 K/UL — SIGNIFICANT CHANGE UP (ref 3.8–10.5)
WBC # FLD AUTO: 8.54 K/UL — SIGNIFICANT CHANGE UP (ref 3.8–10.5)

## 2024-02-28 PROCEDURE — 99232 SBSQ HOSP IP/OBS MODERATE 35: CPT

## 2024-02-28 PROCEDURE — 83036 HEMOGLOBIN GLYCOSYLATED A1C: CPT

## 2024-02-28 PROCEDURE — 86901 BLOOD TYPING SEROLOGIC RH(D): CPT

## 2024-02-28 PROCEDURE — 85025 COMPLETE CBC W/AUTO DIFF WBC: CPT

## 2024-02-28 PROCEDURE — 99285 EMERGENCY DEPT VISIT HI MDM: CPT

## 2024-02-28 PROCEDURE — 83605 ASSAY OF LACTIC ACID: CPT

## 2024-02-28 PROCEDURE — 83690 ASSAY OF LIPASE: CPT

## 2024-02-28 PROCEDURE — 85027 COMPLETE CBC AUTOMATED: CPT

## 2024-02-28 PROCEDURE — 88307 TISSUE EXAM BY PATHOLOGIST: CPT

## 2024-02-28 PROCEDURE — 96361 HYDRATE IV INFUSION ADD-ON: CPT

## 2024-02-28 PROCEDURE — 86850 RBC ANTIBODY SCREEN: CPT

## 2024-02-28 PROCEDURE — 96376 TX/PRO/DX INJ SAME DRUG ADON: CPT

## 2024-02-28 PROCEDURE — 71045 X-RAY EXAM CHEST 1 VIEW: CPT

## 2024-02-28 PROCEDURE — 93005 ELECTROCARDIOGRAM TRACING: CPT

## 2024-02-28 PROCEDURE — 96375 TX/PRO/DX INJ NEW DRUG ADDON: CPT

## 2024-02-28 PROCEDURE — 99024 POSTOP FOLLOW-UP VISIT: CPT

## 2024-02-28 PROCEDURE — 82962 GLUCOSE BLOOD TEST: CPT

## 2024-02-28 PROCEDURE — 86900 BLOOD TYPING SEROLOGIC ABO: CPT

## 2024-02-28 PROCEDURE — 86803 HEPATITIS C AB TEST: CPT

## 2024-02-28 PROCEDURE — 80053 COMPREHEN METABOLIC PANEL: CPT

## 2024-02-28 PROCEDURE — 81003 URINALYSIS AUTO W/O SCOPE: CPT

## 2024-02-28 PROCEDURE — C1889: CPT

## 2024-02-28 PROCEDURE — 36415 COLL VENOUS BLD VENIPUNCTURE: CPT

## 2024-02-28 PROCEDURE — 96365 THER/PROPH/DIAG IV INF INIT: CPT

## 2024-02-28 PROCEDURE — 74177 CT ABD & PELVIS W/CONTRAST: CPT | Mod: MC

## 2024-02-28 PROCEDURE — 80048 BASIC METABOLIC PNL TOTAL CA: CPT

## 2024-02-28 PROCEDURE — 83735 ASSAY OF MAGNESIUM: CPT

## 2024-02-28 RX ORDER — OXYCODONE HYDROCHLORIDE 5 MG/1
1 TABLET ORAL
Qty: 5 | Refills: 0
Start: 2024-02-28

## 2024-02-28 RX ORDER — METRONIDAZOLE 500 MG
1 TABLET ORAL
Qty: 15 | Refills: 0
Start: 2024-02-28 | End: 2024-03-03

## 2024-02-28 RX ADMIN — Medication 975 MILLIGRAM(S): at 06:00

## 2024-02-28 RX ADMIN — PANTOPRAZOLE SODIUM 40 MILLIGRAM(S): 20 TABLET, DELAYED RELEASE ORAL at 12:11

## 2024-02-28 RX ADMIN — PIPERACILLIN AND TAZOBACTAM 25 GRAM(S): 4; .5 INJECTION, POWDER, LYOPHILIZED, FOR SOLUTION INTRAVENOUS at 00:59

## 2024-02-28 RX ADMIN — LISINOPRIL 10 MILLIGRAM(S): 2.5 TABLET ORAL at 05:59

## 2024-02-28 RX ADMIN — PIPERACILLIN AND TAZOBACTAM 25 GRAM(S): 4; .5 INJECTION, POWDER, LYOPHILIZED, FOR SOLUTION INTRAVENOUS at 10:09

## 2024-02-28 RX ADMIN — Medication 975 MILLIGRAM(S): at 00:59

## 2024-02-28 RX ADMIN — HYDROMORPHONE HYDROCHLORIDE 0.5 MILLIGRAM(S): 2 INJECTION INTRAMUSCULAR; INTRAVENOUS; SUBCUTANEOUS at 00:59

## 2024-02-28 RX ADMIN — Medication 975 MILLIGRAM(S): at 12:11

## 2024-02-28 RX ADMIN — Medication 975 MILLIGRAM(S): at 01:16

## 2024-02-28 RX ADMIN — Medication 975 MILLIGRAM(S): at 12:32

## 2024-02-28 RX ADMIN — Medication 975 MILLIGRAM(S): at 07:01

## 2024-02-28 RX ADMIN — SODIUM CHLORIDE 150 MILLILITER(S): 9 INJECTION, SOLUTION INTRAVENOUS at 01:00

## 2024-02-28 RX ADMIN — Medication 1 APPLICATION(S): at 06:00

## 2024-02-28 RX ADMIN — HYDROMORPHONE HYDROCHLORIDE 0.5 MILLIGRAM(S): 2 INJECTION INTRAMUSCULAR; INTRAVENOUS; SUBCUTANEOUS at 01:15

## 2024-02-28 RX ADMIN — Medication 2: at 12:38

## 2024-02-28 NOTE — PROGRESS NOTE ADULT - SUBJECTIVE AND OBJECTIVE BOX
SURGERY PA NOTE ON BEHALF OF DR. POWELL / GENERAL SURGERY:    S: Patient seen and examined at bedside.     Pt states "i feel like a new man". In good spirits. Pain much improved.   tolerating fulls. Passing flatus, awaiting BM. Ambulating and urinating.     MEDICATIONS:  acetaminophen     Tablet .. 975 milliGRAM(s) Oral every 6 hours  atorvastatin 40 milliGRAM(s) Oral at bedtime  cefoTEtan  IVPB 2 Gram(s) IV Intermittent once  dextrose 5%. 1000 milliLiter(s) IV Continuous <Continuous>  dextrose 5%. 1000 milliLiter(s) IV Continuous <Continuous>  dextrose 50% Injectable 25 Gram(s) IV Push once  dextrose 50% Injectable 25 Gram(s) IV Push once  dextrose 50% Injectable 12.5 Gram(s) IV Push once  dextrose Oral Gel 15 Gram(s) Oral once PRN  enoxaparin Injectable 40 milliGRAM(s) SubCutaneous every 24 hours  glucagon  Injectable 1 milliGRAM(s) IntraMuscular once  HYDROmorphone  Injectable 0.5 milliGRAM(s) IV Push every 4 hours PRN  influenza   Vaccine 0.5 milliLiter(s) IntraMuscular once  insulin lispro (ADMELOG) corrective regimen sliding scale   SubCutaneous at bedtime  insulin lispro (ADMELOG) corrective regimen sliding scale   SubCutaneous three times a day before meals  lactated ringers. 1000 milliLiter(s) IV Continuous <Continuous>  lisinopril 10 milliGRAM(s) Oral daily  ondansetron   Disintegrating Tablet 4 milliGRAM(s) Oral once PRN  ondansetron Injectable 4 milliGRAM(s) IV Push once  oxyCODONE    IR 5 milliGRAM(s) Oral every 6 hours PRN  pantoprazole  Injectable 40 milliGRAM(s) IV Push daily  piperacillin/tazobactam IVPB.. 3.375 Gram(s) IV Intermittent every 8 hours  silver sulfADIAZINE 1% Cream 1 Application(s) Topical every 12 hours    O:  Vital Signs Last 24 Hrs  T(C): 36.7 (28 Feb 2024 07:39), Max: 37.2 (27 Feb 2024 23:17)  T(F): 98.1 (28 Feb 2024 07:39), Max: 98.9 (27 Feb 2024 23:17)  HR: 62 (28 Feb 2024 07:39) (62 - 77)  BP: 132/73 (28 Feb 2024 07:39) (132/73 - 156/78)  BP(mean): --  RR: 18 (28 Feb 2024 07:39) (16 - 18)  SpO2: 94% (28 Feb 2024 07:39) (94% - 96%)  Parameters below as of 28 Feb 2024 07:39  Patient On (Oxygen Delivery Method): room air    I&O SUMMARY:  02-27-24 @ 07:01  -  02-28-24 @ 07:00  --------------------------------------------------------  IN: 0 mL / OUT: 630 mL / NET: -630 mL    PHYSICAL EXAM:  Lungs: unlabored, equal chest expansion  Abd: Soft, NT, ND. No rebound/guarding.   Drain site dressing dry. Pt presorts skin reaction from dressing adhesive. Incisions c/d/i.   Drain with 50cc of SS fluid.   Ext: Calves soft, NT, without edema bilat    LABS:                        12.0   8.54  )-----------( 206      ( 28 Feb 2024 06:10 )             36.8     02-28    138  |  104  |  8   ----------------------------<  160<H>  3.9   |  28  |  0.93    Ca    8.9      28 Feb 2024 06:10  Mg     1.7     02-28    RADIOLOGY:  < from: CT Abdomen and Pelvis w/ IV Cont (02.25.24 @ 07:37) >  ACC: [id number] EXAM:  CT ABDOMEN AND PELVIS IC   ORDERED BY: OLIVIER BURCIAGA   PROCEDURE DATE:  02/25/2024    INTERPRETATION:  CLINICAL INFORMATION: Right lower quadrant pain and   tenderness. History of colonic resection performed last year.  COMPARISON: None.  CONTRAST/COMPLICATIONS:  IV Contrast: Omnipaque 350  95 cc administered   5 cc discarded  Oral Contrast: NONE  Complications: None reported at time of study completion  PROCEDURE:  CT of the Abdomen and Pelvis was performed.  Sagittal and coronal reformats were performed.  FINDINGS:  LOWER CHEST: Dependent atelectasis.  LIVER: Within normal limits.  BILE DUCTS: Normal caliber.  GALLBLADDER: Within normal limits.  SPLEEN: Within normal limits.  PANCREAS: Within normal limits.  ADRENALS: Within normal limits.  KIDNEYS/URETERS: Within normal limits.  BLADDER: Within normal limits.  REPRODUCTIVE ORGANS: Prostate within normal limits.  BOWEL: Status post partial right colectomy with anastomosis at the   proximal transverse colon. Mildly dilated small bowel loops in the right   upper quadrant without a transition point. Fecalization of a segment of   small bowel in the right upper quadrant suggestive of slow transit of   intraluminal contents. Questioned wall thickening of a short segment of   mid small bowel. Normal collapse versus wall thickening of a short   segment of the mid sigmoid. Evaluation of the bowel is limited by lack of   oral contrast. Appendix is surgically absent. Colonic diverticulosis.  PERITONEUM: No ascites. Small pockets of extraluminal gas are noted in   the perihepatic region.  VESSELS: Atherosclerotic changes.  RETROPERITONEUM/LYMPH NODES: No lymphadenopathy.  ABDOMINAL WALL: Mild subcutaneous fat stranding and small pockets of gas   in the lower abdominal wall, most likely due to recent injections.  BONES: Degenerative changes.    IMPRESSION:  1. Mildly dilated distal small bowel in the right upper quadrant with   areas of fecalization. No transition point is identified.Free air is   noted around the liver, the etiology of which is not definitively   identified. Recent perforated distal small bowel obstruction is possible,   although the point of obstruction is not identified. Status post partial   right colectomy with anastomosis at the proximal transverse colon.   Surgical consultation is recommended.  2. Questioned wall thickening of a short segment of mid small bowel,   possibly reactive edema. Enteritis cannot be excluded.  3. Normal collapse versus wall thickening of a short segment of the mid   sigmoid. Short segment colitis cannot be excluded.  Findings were discussed with Dr. Sebastian on 2/25/2024 8:18 AM by Dr. Soto with read back confirmation.  --- End of Report ---  < end of copied text >    A:  63M hld, htn, dm2 with insulin pump POD#3 laparoscopy with resection of ileal segment for perforated bowel  Pt surgically stable. Labs and vitals stable. Progressing well.     P:  NPO with sips of water, ice chips only for now  Zosyn- PO augmentin upon discharge  OOB ad ana rosa  Reg diet   DVT ppx  Monitor drain output and drain care  Monitor bowel function  Planning dc, f/u out pt      Case and plan to be discussed with Dr. Powell     SURGERY PA NOTE ON BEHALF OF DR. POWELL / GENERAL SURGERY:    S: Patient seen and examined at bedside.     Pt states "i feel like a new man". In good spirits. Pain much improved.   tolerating fulls. Passing flatus, awaiting BM. Ambulating and urinating.     MEDICATIONS:  acetaminophen     Tablet .. 975 milliGRAM(s) Oral every 6 hours  atorvastatin 40 milliGRAM(s) Oral at bedtime  cefoTEtan  IVPB 2 Gram(s) IV Intermittent once  dextrose 5%. 1000 milliLiter(s) IV Continuous <Continuous>  dextrose 5%. 1000 milliLiter(s) IV Continuous <Continuous>  dextrose 50% Injectable 25 Gram(s) IV Push once  dextrose 50% Injectable 25 Gram(s) IV Push once  dextrose 50% Injectable 12.5 Gram(s) IV Push once  dextrose Oral Gel 15 Gram(s) Oral once PRN  enoxaparin Injectable 40 milliGRAM(s) SubCutaneous every 24 hours  glucagon  Injectable 1 milliGRAM(s) IntraMuscular once  HYDROmorphone  Injectable 0.5 milliGRAM(s) IV Push every 4 hours PRN  influenza   Vaccine 0.5 milliLiter(s) IntraMuscular once  insulin lispro (ADMELOG) corrective regimen sliding scale   SubCutaneous at bedtime  insulin lispro (ADMELOG) corrective regimen sliding scale   SubCutaneous three times a day before meals  lactated ringers. 1000 milliLiter(s) IV Continuous <Continuous>  lisinopril 10 milliGRAM(s) Oral daily  ondansetron   Disintegrating Tablet 4 milliGRAM(s) Oral once PRN  ondansetron Injectable 4 milliGRAM(s) IV Push once  oxyCODONE    IR 5 milliGRAM(s) Oral every 6 hours PRN  pantoprazole  Injectable 40 milliGRAM(s) IV Push daily  piperacillin/tazobactam IVPB.. 3.375 Gram(s) IV Intermittent every 8 hours  silver sulfADIAZINE 1% Cream 1 Application(s) Topical every 12 hours    O:  Vital Signs Last 24 Hrs  T(C): 36.7 (28 Feb 2024 07:39), Max: 37.2 (27 Feb 2024 23:17)  T(F): 98.1 (28 Feb 2024 07:39), Max: 98.9 (27 Feb 2024 23:17)  HR: 62 (28 Feb 2024 07:39) (62 - 77)  BP: 132/73 (28 Feb 2024 07:39) (132/73 - 156/78)  BP(mean): --  RR: 18 (28 Feb 2024 07:39) (16 - 18)  SpO2: 94% (28 Feb 2024 07:39) (94% - 96%)  Parameters below as of 28 Feb 2024 07:39  Patient On (Oxygen Delivery Method): room air    I&O SUMMARY:  02-27-24 @ 07:01  -  02-28-24 @ 07:00  --------------------------------------------------------  IN: 0 mL / OUT: 630 mL / NET: -630 mL    PHYSICAL EXAM:  Lungs: unlabored, equal chest expansion  Abd: Soft, NT, ND. No rebound/guarding.   Drain site dressing dry. Pt presorts skin reaction from dressing adhesive. Incisions c/d/i.   Drain with 50cc of SS fluid.   Ext: Calves soft, NT, without edema bilat    LABS:                        12.0   8.54  )-----------( 206      ( 28 Feb 2024 06:10 )             36.8     02-28    138  |  104  |  8   ----------------------------<  160<H>  3.9   |  28  |  0.93    Ca    8.9      28 Feb 2024 06:10  Mg     1.7     02-28    RADIOLOGY:  < from: CT Abdomen and Pelvis w/ IV Cont (02.25.24 @ 07:37) >  ACC: [id number] EXAM:  CT ABDOMEN AND PELVIS IC   ORDERED BY: OLIVIER BURCIAGA   PROCEDURE DATE:  02/25/2024    INTERPRETATION:  CLINICAL INFORMATION: Right lower quadrant pain and   tenderness. History of colonic resection performed last year.  COMPARISON: None.  CONTRAST/COMPLICATIONS:  IV Contrast: Omnipaque 350  95 cc administered   5 cc discarded  Oral Contrast: NONE  Complications: None reported at time of study completion  PROCEDURE:  CT of the Abdomen and Pelvis was performed.  Sagittal and coronal reformats were performed.  FINDINGS:  LOWER CHEST: Dependent atelectasis.  LIVER: Within normal limits.  BILE DUCTS: Normal caliber.  GALLBLADDER: Within normal limits.  SPLEEN: Within normal limits.  PANCREAS: Within normal limits.  ADRENALS: Within normal limits.  KIDNEYS/URETERS: Within normal limits.  BLADDER: Within normal limits.  REPRODUCTIVE ORGANS: Prostate within normal limits.  BOWEL: Status post partial right colectomy with anastomosis at the   proximal transverse colon. Mildly dilated small bowel loops in the right   upper quadrant without a transition point. Fecalization of a segment of   small bowel in the right upper quadrant suggestive of slow transit of   intraluminal contents. Questioned wall thickening of a short segment of   mid small bowel. Normal collapse versus wall thickening of a short   segment of the mid sigmoid. Evaluation of the bowel is limited by lack of   oral contrast. Appendix is surgically absent. Colonic diverticulosis.  PERITONEUM: No ascites. Small pockets of extraluminal gas are noted in   the perihepatic region.  VESSELS: Atherosclerotic changes.  RETROPERITONEUM/LYMPH NODES: No lymphadenopathy.  ABDOMINAL WALL: Mild subcutaneous fat stranding and small pockets of gas   in the lower abdominal wall, most likely due to recent injections.  BONES: Degenerative changes.    IMPRESSION:  1. Mildly dilated distal small bowel in the right upper quadrant with   areas of fecalization. No transition point is identified.Free air is   noted around the liver, the etiology of which is not definitively   identified. Recent perforated distal small bowel obstruction is possible,   although the point of obstruction is not identified. Status post partial   right colectomy with anastomosis at the proximal transverse colon.   Surgical consultation is recommended.  2. Questioned wall thickening of a short segment of mid small bowel,   possibly reactive edema. Enteritis cannot be excluded.  3. Normal collapse versus wall thickening of a short segment of the mid   sigmoid. Short segment colitis cannot be excluded.  Findings were discussed with Dr. Sebastian on 2/25/2024 8:18 AM by Dr. Soto with read back confirmation.  --- End of Report ---  < end of copied text >    A:  63M hld, htn, dm2 with insulin pump POD#3 laparoscopy with resection of ileal segment for perforated bowel  Pt surgically stable. Labs and vitals stable. Progressing well.     P:  NPO with sips of water, ice chips only for now  Zosyn- PO augmentin + flagyl upon discharge  OOB ad ana rosa  Reg diet   DVT ppx  Monitor drain output and drain care  Monitor bowel function  Planning dc, f/u out pt      Case and plan to be discussed with Dr. Powell

## 2024-02-28 NOTE — PROGRESS NOTE ADULT - REASON FOR ADMISSION
Abdominal pain.  Free intraperitoneal air

## 2024-02-28 NOTE — DISCHARGE NOTE PROVIDER - NSDCCPTREATMENT_GEN_ALL_CORE_FT
PRINCIPAL PROCEDURE  Procedure: Diagnostic laparoscopy  Findings and Treatment: with small bowel resection

## 2024-02-28 NOTE — PROGRESS NOTE ADULT - SUBJECTIVE AND OBJECTIVE BOX
INTERVAL HPI/OVERNIGHT EVENTS:  POD#3 laparoscopy with resection of ileal segment for perforated bowel    No acute overnight events  Tolerating liquid diet   Passing flatus  Voiding  Ambulating  Denied abd pain       MEDICATIONS  (STANDING):  atorvastatin 40 milliGRAM(s) Oral at bedtime  cefoTEtan  IVPB 2 Gram(s) IV Intermittent once  dextrose 5%. 1000 milliLiter(s) (100 mL/Hr) IV Continuous <Continuous>  dextrose 5%. 1000 milliLiter(s) (50 mL/Hr) IV Continuous <Continuous>  dextrose 50% Injectable 25 Gram(s) IV Push once  dextrose 50% Injectable 12.5 Gram(s) IV Push once  dextrose 50% Injectable 25 Gram(s) IV Push once  glucagon  Injectable 1 milliGRAM(s) IntraMuscular once  insulin lispro (ADMELOG) corrective regimen sliding scale   SubCutaneous every 6 hours  lactated ringers. 1000 milliLiter(s) (150 mL/Hr) IV Continuous <Continuous>  lisinopril 10 milliGRAM(s) Oral daily  ondansetron Injectable 4 milliGRAM(s) IV Push once  pantoprazole  Injectable 40 milliGRAM(s) IV Push daily  piperacillin/tazobactam IVPB.. 3.375 Gram(s) IV Intermittent every 8 hours    MEDICATIONS  (PRN):  dextrose Oral Gel 15 Gram(s) Oral once PRN Blood Glucose LESS THAN 70 milliGRAM(s)/deciliter  HYDROmorphone  Injectable 0.5 milliGRAM(s) IV Push every 4 hours PRN Severe Pain (7 - 10)  oxyCODONE    IR 5 milliGRAM(s) Oral every 6 hours PRN Moderate Pain (4 - 6)      Allergies    No Known Allergies    Intolerances        REVIEW OF SYSTEMS:  negative other than aforementioned    Vital Signs Last 24 Hrs  T(C): 36.9 (26 Feb 2024 07:23), Max: 37.3 (25 Feb 2024 11:24)  T(F): 98.4 (26 Feb 2024 07:23), Max: 99.2 (25 Feb 2024 13:25)  HR: 72 (26 Feb 2024 07:23) (70 - 100)  BP: 115/66 (26 Feb 2024 07:23) (102/64 - 154/80)  BP(mean): 82 (25 Feb 2024 20:01) (82 - 82)  RR: 18 (26 Feb 2024 07:23) (13 - 18)  SpO2: 97% (26 Feb 2024 07:23) (94% - 99%)    Parameters below as of 26 Feb 2024 07:23  Patient On (Oxygen Delivery Method): room air        PHYSICAL EXAM:  GENERAL: NAD   HEAD:  Atraumatic, Normocephalic  EYES: EOMI, PERRLA, conjunctiva and sclera clear  ENMT: Moist mucous membranes, No lesions; No tonsillar erythema, exudates, or enlargement  NECK: Supple, No JVD, Normal thyroid  NERVOUS SYSTEM:  Alert & Oriented X3, Good concentration; All 4 extremities mobile, no gross sensory deficits.   CHEST/LUNG: Clear to auscultation bilaterally; No rales, rhonchi, wheezing, or rubs  HEART: Regular rate and rhythm; No murmurs, rubs, or gallops  ABDOMEN: abdomen expected ttp, drain with 50cc serosang fluid    LABS:                        11.8   11.50 )-----------( 189      ( 26 Feb 2024 06:30 )             37.8     26 Feb 2024 06:30    137    |  102    |  14     ----------------------------<  179    4.2     |  28     |  1.17     Ca    8.3        26 Feb 2024 06:30        Urinalysis Basic - ( 26 Feb 2024 06:30 )    Color: x / Appearance: x / SG: x / pH: x  Gluc: 179 mg/dL / Ketone: x  / Bili: x / Urobili: x   Blood: x / Protein: x / Nitrite: x   Leuk Esterase: x / RBC: x / WBC x   Sq Epi: x / Non Sq Epi: x / Bacteria: x      CAPILLARY BLOOD GLUCOSE      POCT Blood Glucose.: 167 mg/dL (26 Feb 2024 05:52)  POCT Blood Glucose.: 116 mg/dL (25 Feb 2024 23:43)  POCT Blood Glucose.: 222 mg/dL (25 Feb 2024 18:34)  POCT Blood Glucose.: 235 mg/dL (25 Feb 2024 13:27)  POCT Blood Glucose.: 277 mg/dL (25 Feb 2024 11:22)      RADIOLOGY & ADDITIONAL TESTS:

## 2024-02-28 NOTE — DISCHARGE NOTE NURSING/CASE MANAGEMENT/SOCIAL WORK - NSDCPEFALRISK_GEN_ALL_CORE
For information on Fall & Injury Prevention, visit: [email or web address] OR  [email or web address] OR  https://www.cdc.gov/steadi/patient.html

## 2024-02-28 NOTE — DISCHARGE NOTE PROVIDER - NSDCFUADDINST_GEN_ALL_CORE_FT
Empty and record Ulises-Garcia (TERELL) drainage output as instructed 2-3 x a day.   Leave steri strips. Change drain dressing daily. Replace gauze and cover with tape.   Increase ambulation and use incentive spirometer as directed.   Please call the office of you develop any shortness of breath, chest pain, increased abdominal pain, fevers/chills  Empty and record Ulises-Garcia (TERELL) drainage output as instructed 2-3 x a day.   Leave steri strips. Change drain dressing daily. Replace gauze and cover with tape.   You may take colace or miralax as needed for constipation, especially if taking opioid pain killers.   You may take advil (ibuprofen) or tylenol (acetaminophen) over the counter for mild/moderate pain. Take advil with food and do not exceed 4000mg of tylenol in 24 hours.   Increase ambulation and use incentive spirometer as directed.   Please call the office of you develop any shortness of breath, chest pain, increased abdominal pain, fevers/chills

## 2024-02-28 NOTE — DISCHARGE NOTE NURSING/CASE MANAGEMENT/SOCIAL WORK - PATIENT PORTAL LINK FT
You can access the FollowMyHealth Patient Portal offered by [facility] by registering at the following website: [email or web address]. By joining [facility]’s FollowMyHealth portal, you will also be able to view your health information using other applications (apps) compatible with our system.

## 2024-02-28 NOTE — PROGRESS NOTE ADULT - ASSESSMENT
63M hld, htn, dm2 with insulin pump POD#3 laparoscopy with resection of ileal segment for perforated bowel    1. POD#3 laparoscopy with resection of ileal segment for perforated bowel  Progressing with return of gut function slowly   Advanced to regular diet, continue VTE ppx, multimodal analgesia, IS, ambulation     2. DM2  hold insulin pump  Case discussed with Dr. Perlman endocrine.  He said it was okay to turn off insulin pump and recommended to have LDISS while pump is off  continue q6h LDISS while NPO    3. HTN  Continue lisinopril     4. HLD   Continue atorvastatin 10mg     DVT ppx: Enoxaparin   
Doing well, POD 1  Tolerating clears.    Reports flatus but no BM   Abdominal pain seems to be resolving.  CBC stable.    Continue supportive care.  Incentive spirometry  Drain care instruction/education.  Continue DVT and GI prophylaxis.  Continue IV antibiotics.  Discharge planning once bowel function returns.
62 yo m pmh hld, htn, dm2 with insulin pump     #Perforated bowl  Surgery consulted, will likely go to OR today  likely an urgent case  no medical contraindication for urgent procedure  optimized  POD #1  per surgical team , continue NPO      #DM2  hold insulin pump  LDISS started  a1c  Spoke with Dr. Craig Perlman endocrine.  He said it was okay to turn off insulin pump and recommended to have LDISS while pump is off  continue q6h LDISS while NPO    #HTN  BP currently stable, hold PO meds  if BP unstable, can give IV lopressor or IV hydralazine    #HLD  hold statins, can resume once allowed to have PO meds    #DVT proph  hold chemical AC ,will defer to surgery team once patient has procedure  SCDs  
63M hld, htn, dm2 with insulin pump POD#2 laparoscopy with resection of ileal segment for perforated bowel    1. POD#2 laparoscopy with resection of ileal segment for perforated bowel  Progressing with return of gut function slowly   Consider stopping iv fluids, if ppi needed switch to po  Continue clears, VTE ppx enoxaparin, multimodal analgesia, IS, ambulation     2. DM2  hold insulin pump  Case discussed with Dr. Perlman endocrine.  He said it was okay to turn off insulin pump and recommended to have LDISS while pump is off  continue q6h LDISS while NPO    3. HTN  BP currently stable 130-140sbp, hold PO meds and will reintroduce slowly  once tolerating po    4. HLD   Continue atorvastatin 10mg     DVT ppx: Enoxaparin

## 2024-02-28 NOTE — DISCHARGE NOTE NURSING/CASE MANAGEMENT/SOCIAL WORK - NSDCFUADDAPPT_GEN_ALL_CORE_FT
Please call Dr. TAMMI Gaviria's office ([phone]) to schedule a follow-up appointment to be seen next week.   Contact primary care provider for medication adjustments and dosages.   Resume glucose monitoring at home and insulin regimen.

## 2024-02-28 NOTE — DISCHARGE NOTE PROVIDER - HOSPITAL COURSE
Problem: Patient Care Overview  Goal: Plan of Care Review  Outcome: Ongoing (interventions implemented as appropriate)  Patient currently resting on vent/spontaneous, PEEP:14, FIO2: 80%. Pressures very labile, MAP>65 with nicardipine infusing. Levo on and off throughout day. Labetalol given IVP PRN x1. HR  sinus. I unit PRBC's and I cryo given. Patient anuric - Tolerating  with citrate calcium chloride infusing. Fentanyl and precedex infusing. TMax 38.6 - patient currently on cooling blanket. Plan to keep patient comfortable overnight and titrate vent and pressors as tolerated. Family updated regarding plan of care - questions answered. Will continue to monitor.        64 y/o male with PMHx of type 2 diabetes on insulin, hypertension, hyperlipidemia, hx of hemicolectomy for colon cancer admitted to hospital on 2/25/2024 for abdominal pain, pneumoperitoneum, leukocytosis and underwent diagnostic laparoscopy with small bowel resection due to suspected microperforation on 2/25/2024 by Dr. Gaviria. Patient tolerated procedure well and progressed appropriately. Patient had a JPDrain placed and monitored post op. Medicine, surgery, endocrine teams followed this patient's course. Vitals and labs are stable and reassuring. Currently tolerating regular diet, voiding independently, with bowel function and ambulating. Discharged on 2/28/2024 with home medications, incentive spirometer and PRESCRIPTIONS/MEDS TO BED to follow-up with SURGEON/ in 7 days. 62 y/o male with PMHx of type 2 diabetes on insulin, hypertension, hyperlipidemia, hx of hemicolectomy for colon cancer admitted to hospital on 2/25/2024 with acute abdomen, abdominal pain, pneumoperitoneum, leukocytosis and underwent diagnostic laparoscopy with small bowel resection due to suspected microperforation on 2/25/2024 by Dr. Gaviria.  Found to have purulent peritonitis with fibrinous exudate at Ileocolic anastomosis with staple line of ileum adherent to Small bowel mesentery. Blind limb of Ileocolic anastomosis was resected.  Anastomosis remains patient.  Patient tolerated procedure well and progressed appropriately. Patient had a JPDrain placed and monitored post op. Medicine, surgery, endocrine teams followed this patient's course. Vitals and labs are stable and reassuring. Currently tolerating regular diet, voiding independently, with bowel function and ambulating. Discharged on 2/28/2024 with home medications, incentive spirometer and PRESCRIPTIONS/MEDS TO BED to follow-up with SURGEON/ in 7 days.

## 2024-02-28 NOTE — DISCHARGE NOTE PROVIDER - CARE PROVIDER_API CALL
Erik Gaviria  Surgery  [address] [place] [id number]  Phone: [phone]  Fax: [phone]  Follow Up Time:

## 2024-02-28 NOTE — DISCHARGE NOTE PROVIDER - NSDCFUADDAPPT_GEN_ALL_CORE_FT
Please call Dr. TAMMI Gaviria's office ([phone]) to schedule a follow-up appointment to be seen in 7-10 days.   Contact primary care provider for medication adjustments and dosages.  Please call Dr. TAMMI Gaviria's office ([phone]) to schedule a follow-up appointment to be seen next week.   Contact primary care provider for medication adjustments and dosages.   Resume glucose monitoring at home and insulin regimen.

## 2024-02-28 NOTE — DISCHARGE NOTE PROVIDER - NSDCMRMEDTOKEN_GEN_ALL_CORE_FT
amoxicillin-clavulanate 875 mg-125 mg oral tablet: 875 milligram(s) orally 2 times a day  HumaLOG 100 units/mL injectable solution: injectable  lisinopril 10 mg oral tablet: 1 tab(s) orally once a day  metFORMIN 1000 mg oral tablet, extended release: 1 tab(s) orally 2 times a day  metroNIDAZOLE 500 mg oral tablet: 1 tab(s) orally 3 times a day  omeprazole 20 mg oral delayed release tablet: 1 tab(s) orally once a day  simvastatin 20 mg oral tablet: 1 tab(s) orally once a day  Trulicity Pen 1.5 mg/0.5 mL subcutaneous solution: 1.5 milligram(s) subcutaneously once a week   amoxicillin-clavulanate 875 mg-125 mg oral tablet: 875 milligram(s) orally 2 times a day  HumaLOG 100 units/mL injectable solution: injectable  lisinopril 10 mg oral tablet: 1 tab(s) orally once a day  metFORMIN 1000 mg oral tablet, extended release: 1 tab(s) orally 2 times a day  metroNIDAZOLE 500 mg oral tablet: 1 tab(s) orally 3 times a day  omeprazole 20 mg oral delayed release tablet: 1 tab(s) orally once a day  oxyCODONE 5 mg oral tablet: 1 tab(s) orally every 6 hours as needed for  severe pain MDD: 4 tablets  simvastatin 20 mg oral tablet: 1 tab(s) orally once a day  Trulicity Pen 1.5 mg/0.5 mL subcutaneous solution: 1.5 milligram(s) subcutaneously once a week

## 2024-02-29 PROBLEM — I10 ESSENTIAL (PRIMARY) HYPERTENSION: Chronic | Status: ACTIVE | Noted: 2024-02-25

## 2024-02-29 PROBLEM — Z00.00 ENCOUNTER FOR PREVENTIVE HEALTH EXAMINATION: Status: ACTIVE | Noted: 2024-02-29

## 2024-03-05 ENCOUNTER — TRANSCRIPTION ENCOUNTER (OUTPATIENT)
Age: 64
End: 2024-03-05

## 2024-03-05 ENCOUNTER — APPOINTMENT (OUTPATIENT)
Dept: SURGERY | Facility: CLINIC | Age: 64
End: 2024-03-05
Payer: COMMERCIAL

## 2024-03-05 VITALS
WEIGHT: 182 LBS | HEIGHT: 69 IN | TEMPERATURE: 97.2 F | BODY MASS INDEX: 26.96 KG/M2 | DIASTOLIC BLOOD PRESSURE: 74 MMHG | SYSTOLIC BLOOD PRESSURE: 131 MMHG | OXYGEN SATURATION: 98 % | HEART RATE: 82 BPM

## 2024-03-05 DIAGNOSIS — K63.1 PERFORATION OF INTESTINE (NONTRAUMATIC): ICD-10-CM

## 2024-03-05 DIAGNOSIS — Z78.9 OTHER SPECIFIED HEALTH STATUS: ICD-10-CM

## 2024-03-05 DIAGNOSIS — E11.49 TYPE 2 DIABETES MELLITUS WITH OTHER DIABETIC NEUROLOGICAL COMPLICATION: ICD-10-CM

## 2024-03-05 DIAGNOSIS — E11.9 TYPE 2 DIABETES MELLITUS W/OUT COMPLICATIONS: ICD-10-CM

## 2024-03-05 DIAGNOSIS — F19.90 OTHER PSYCHOACTIVE SUBSTANCE USE, UNSPECIFIED, UNCOMPLICATED: ICD-10-CM

## 2024-03-05 PROCEDURE — 99024 POSTOP FOLLOW-UP VISIT: CPT

## 2024-03-12 PROBLEM — Z78.9 NON-SMOKER: Status: ACTIVE | Noted: 2024-03-05

## 2024-03-12 PROBLEM — F19.90 DRUG USE: Status: ACTIVE | Noted: 2024-03-05

## 2024-03-12 PROBLEM — Z78.9 SOCIAL ALCOHOL USE: Status: ACTIVE | Noted: 2024-03-05

## 2024-03-12 PROBLEM — E11.49 DIABETES MELLITUS TYPE 2 WITH NEUROLOGICAL MANIFESTATIONS: Status: ACTIVE | Noted: 2024-03-05

## 2024-03-12 PROBLEM — E11.9 TYPE 2 DIABETES MELLITUS WITHOUT COMPLICATION, UNSPECIFIED WHETHER LONG TERM INSULIN USE: Status: ACTIVE | Noted: 2024-03-05

## 2024-03-12 RX ORDER — INSULIN LISPRO 100 [IU]/ML
INJECTION, SOLUTION INTRAVENOUS; SUBCUTANEOUS
Refills: 0 | Status: ACTIVE | COMMUNITY

## 2024-03-12 RX ORDER — OMEPRAZOLE 40 MG/1
CAPSULE, DELAYED RELEASE ORAL
Refills: 0 | Status: ACTIVE | COMMUNITY

## 2024-03-12 RX ORDER — METFORMIN HYDROCHLORIDE 625 MG/1
TABLET ORAL
Refills: 0 | Status: ACTIVE | COMMUNITY

## 2024-03-12 RX ORDER — SIMVASTATIN 40 MG/1
TABLET, FILM COATED ORAL
Refills: 0 | Status: ACTIVE | COMMUNITY

## 2024-03-12 RX ORDER — DULAGLUTIDE 1.5 MG/.5ML
1.5 INJECTION, SOLUTION SUBCUTANEOUS
Refills: 0 | Status: ACTIVE | COMMUNITY

## 2024-03-12 RX ORDER — LISINOPRIL 30 MG/1
TABLET ORAL
Refills: 0 | Status: ACTIVE | COMMUNITY

## 2024-03-14 PROBLEM — K63.1 SMALL BOWEL PERFORATION: Status: RESOLVED | Noted: 2024-03-14 | Resolved: 2024-03-14

## 2024-03-14 NOTE — CONSULT LETTER
[Dear  ___] : Dear  [unfilled], [Courtesy Letter:] : I had the pleasure of seeing your patient, [unfilled], in my office today. [Referral Closing:] : Thank you very much for seeing this patient.  If you have any questions, please do not hesitate to contact me. [Please see my note below.] : Please see my note below. [Sincerely,] : Sincerely, [FreeTextEntry3] : Erik Gaviria MD [facility] Advanced Minimally Invasive, Robotic, General & Bariatric Surgery Chair, Dept of Surgery, [facility] Physician [address] [address] P: [phone] F: [phone]

## 2024-03-14 NOTE — ASSESSMENT
[FreeTextEntry1] : Unremarkable postoperative course status post diagnostic laparoscopy for revision of ileocolic anastomosis.  This presumed the patient had a small perforation and leak from the blind end of the ileal segment at the ileocolic anastomosis.  This was resected.  Postoperative course was unremarkable, and patient returns today without new complaints. He reports tolerating regular diet with resumption of normal bowel function.  Abdomen remains soft, nontender nondistended, positive bowel sounds in all four quads.  No hernia or masses.  Surgical incisions remain well approximated and healing appropriately without erythema, induration or fluctuance.  Postoperative instructions have been provided including avoidance of heavy lifting and strenuous activities in excess of 20-25 pounds for duration of 4-6 weeks. The patient may shower keeping the incisions clean, dry, covered as needed.  Patient is encouraged to follow-up with his gastroenterologist and/or colon surgeon for routine surveillance given history of right-sided colon cancer.  Follow-up with me as needed

## 2024-03-14 NOTE — HISTORY OF PRESENT ILLNESS
[de-identified] : History of right hemicolectomy performed by Dr. Dayo Soto at [facility].  Recently presented to the emergency department at [place] with acute abdominal pain found to have free intraperitoneal air suspicious for perforation.  Inflammatory changes seen at the ileocolic anastomosis. Patient was taken emergently for exploration of the blind limb of the ileocolic anastomosis was revised.  Pathology reveals:  Segment of small intestine with reactive changes, mild acute and chronic inflammation, edema, and acute serositis. Staples embedded within intestinal wall with reactive changes. Returns today for routine postoperative follow-up

## 2024-03-14 NOTE — PHYSICAL EXAM
[No Rash or Lesion] : No rash or lesion [Alert] : alert [Oriented to Person] : oriented to person [Oriented to Place] : oriented to place [Oriented to Time] : oriented to time [Calm] : calm [de-identified] : Healthy-appearing gentleman in no acute distress [de-identified] : NCAT, PERRLA, EOMI.  No scleral icterus or jaundice [de-identified] : Soft, nontender nondistended, positive bowel sounds in all four quads.  No hernia or masses.  Surgical incisions remain well approximated and healing appropriately without erythema, induration or fluctuance. [de-identified] : Ambulating without difficulty or assistance

## 2024-03-20 PROBLEM — C18.9 MALIGNANT NEOPLASM OF COLON, UNSPECIFIED: Chronic | Status: ACTIVE | Noted: 2024-02-25

## 2024-09-10 NOTE — ED ADULT NURSE NOTE - NS ED NURSE RECORD ANOTHER VITAL SIGN
levothyroxine (SYNTHROID/LEVOTHROID) 75 MCG tablet       Last Written Prescription Date:  11/27/23  Last Fill Quantity: 90,   # refills: 3  Last Office Visit : 11/27/23  Future Office visit:  One year  TSH   Date Value Ref Range Status   06/25/2024 2.35 0.30 - 4.20 uIU/mL Final   05/12/2022 0.16 (L) 0.40 - 4.00 mU/L Final   07/05/2021 0.22 (L) 0.40 - 4.00 mU/L Final            
Yes

## (undated) DEVICE — FOLEY TRAY 16FR SURESTEP LTX BG

## (undated) DEVICE — TROCAR ETHICON ENDOPATH XCEL UNIVERSAL SLEEVE WITH OPTIVIEW 5MM X 100MM

## (undated) DEVICE — DRAIN JACKSON PRATT 10FR ROUND END NO TROCAR

## (undated) DEVICE — DRAIN JACKSON PRATT 10MM FLAT 3/4 NO TROCAR

## (undated) DEVICE — VENODYNE/SCD SLEEVE CALF MEDIUM

## (undated) DEVICE — Device

## (undated) DEVICE — SOL IRR POUR NS 0.9% 1000ML

## (undated) DEVICE — SUT POLYSORB 4-0 18" P-13 UNDYED

## (undated) DEVICE — TROCAR COVIDIEN ENDO CLOSE SUTURING DEVICE

## (undated) DEVICE — SUT POLYSORB 0 30" GS-23

## (undated) DEVICE — DRAPE 3/4 SHEET 52X76"

## (undated) DEVICE — TUBING STRYKER PNEUMOCLEAR HIGH FLOW

## (undated) DEVICE — D HELP - CLEARVIEW CLEARIFY SYSTEM

## (undated) DEVICE — STAPLER SKIN VISI-STAT 35 WIDE

## (undated) DEVICE — ENDOCATCH 10MM SPECIMEN POUCH

## (undated) DEVICE — SOL IRR POUR H2O 1000ML

## (undated) DEVICE — SOL INJ NS 0.9% 1000ML

## (undated) DEVICE — LIGASURE MARYLAND 37CM

## (undated) DEVICE — TUBING STRYKEFLOW II SUCTION / IRRIGATOR

## (undated) DEVICE — SMOKE EVACUTATION SYS LAPROSCOPIC AC/PA

## (undated) DEVICE — SUT POLYSORB 3-0 30" V-20

## (undated) DEVICE — GLV 7 PROTEXIS (WHITE)

## (undated) DEVICE — PACK GENERAL LAPAROSCOPY

## (undated) DEVICE — DRAPE TOWEL BLUE 17" X 24"

## (undated) DEVICE — ELCTR BOVIE TIP BLADE INSULATED 2.75" EDGE

## (undated) DEVICE — SYR LUER LOK 10CC

## (undated) DEVICE — LIGASURE L-HOOK 44CM

## (undated) DEVICE — INSUFFLATION NDL COVIDIEN SURGINEEDLE VERESS 120MM

## (undated) DEVICE — BLADE SURGICAL #15 CARBON

## (undated) DEVICE — WARMING BLANKET LOWER ADULT

## (undated) DEVICE — NDL HYPO SAFE 18G X 1.5" (PINK)

## (undated) DEVICE — STAPLER COVIDIEN ENDO GIA XL HANDLE

## (undated) DEVICE — SUT ENDOSTITCH DEVICE 10MM

## (undated) DEVICE — NDL HYPO SAFE 22G X 1.5" (BLACK)

## (undated) DEVICE — TROCAR COVIDIEN VERSAONE BLUNT TIP HASSAN 12MM

## (undated) DEVICE — STAPLER COVIDIEN ENDO GIA STANDARD HANDLE

## (undated) DEVICE — SPONGE ENDO PEANUT 5MM

## (undated) DEVICE — GLV 8 PROTEXIS (BLUE)

## (undated) DEVICE — ELCTR STRYKER NEPTUNE SMOKE EVACUATION PENCIL (GREEN)

## (undated) DEVICE — TROCAR ETHICON ENDOPATH XCEL BLADELESS 5MM X 100MM STABILITY